# Patient Record
Sex: MALE | Race: BLACK OR AFRICAN AMERICAN
[De-identification: names, ages, dates, MRNs, and addresses within clinical notes are randomized per-mention and may not be internally consistent; named-entity substitution may affect disease eponyms.]

---

## 2019-04-28 ENCOUNTER — HOSPITAL ENCOUNTER (INPATIENT)
Dept: HOSPITAL 61 - ER | Age: 64
LOS: 2 days | Discharge: HOME | DRG: 871 | End: 2019-04-30
Attending: INTERNAL MEDICINE | Admitting: INTERNAL MEDICINE
Payer: COMMERCIAL

## 2019-04-28 VITALS — SYSTOLIC BLOOD PRESSURE: 135 MMHG | DIASTOLIC BLOOD PRESSURE: 79 MMHG

## 2019-04-28 VITALS — DIASTOLIC BLOOD PRESSURE: 109 MMHG | SYSTOLIC BLOOD PRESSURE: 182 MMHG

## 2019-04-28 VITALS — BODY MASS INDEX: 21.85 KG/M2 | HEIGHT: 76 IN | WEIGHT: 179.44 LBS

## 2019-04-28 VITALS — DIASTOLIC BLOOD PRESSURE: 68 MMHG | SYSTOLIC BLOOD PRESSURE: 138 MMHG

## 2019-04-28 DIAGNOSIS — I49.3: ICD-10-CM

## 2019-04-28 DIAGNOSIS — I71.4: ICD-10-CM

## 2019-04-28 DIAGNOSIS — I50.33: ICD-10-CM

## 2019-04-28 DIAGNOSIS — Z99.2: ICD-10-CM

## 2019-04-28 DIAGNOSIS — I16.1: ICD-10-CM

## 2019-04-28 DIAGNOSIS — I13.2: ICD-10-CM

## 2019-04-28 DIAGNOSIS — S33.5XXA: ICD-10-CM

## 2019-04-28 DIAGNOSIS — E78.5: ICD-10-CM

## 2019-04-28 DIAGNOSIS — Z86.73: ICD-10-CM

## 2019-04-28 DIAGNOSIS — I34.0: ICD-10-CM

## 2019-04-28 DIAGNOSIS — M19.90: ICD-10-CM

## 2019-04-28 DIAGNOSIS — I24.8: ICD-10-CM

## 2019-04-28 DIAGNOSIS — A41.9: Primary | ICD-10-CM

## 2019-04-28 DIAGNOSIS — F12.90: ICD-10-CM

## 2019-04-28 DIAGNOSIS — D63.1: ICD-10-CM

## 2019-04-28 DIAGNOSIS — M17.0: ICD-10-CM

## 2019-04-28 DIAGNOSIS — J44.0: ICD-10-CM

## 2019-04-28 DIAGNOSIS — Z87.891: ICD-10-CM

## 2019-04-28 DIAGNOSIS — J40: ICD-10-CM

## 2019-04-28 DIAGNOSIS — J18.9: ICD-10-CM

## 2019-04-28 DIAGNOSIS — N18.6: ICD-10-CM

## 2019-04-28 DIAGNOSIS — Z82.49: ICD-10-CM

## 2019-04-28 LAB
ALBUMIN SERPL-MCNC: 3.3 G/DL (ref 3.4–5)
ALBUMIN/GLOB SERPL: 0.6 {RATIO} (ref 1–1.7)
ALP SERPL-CCNC: 129 U/L (ref 46–116)
ALT SERPL-CCNC: 10 U/L (ref 16–63)
ANION GAP SERPL CALC-SCNC: 12 MMOL/L (ref 6–14)
APTT BLD: 31 SEC (ref 24–38)
AST SERPL-CCNC: 17 U/L (ref 15–37)
BASOPHILS # BLD AUTO: 0.1 X10^3/UL (ref 0–0.2)
BASOPHILS NFR BLD: 1 % (ref 0–3)
BILIRUB SERPL-MCNC: 0.7 MG/DL (ref 0.2–1)
BUN SERPL-MCNC: 47 MG/DL (ref 8–26)
BUN/CREAT SERPL: 4 (ref 6–20)
CALCIUM SERPL-MCNC: 10.3 MG/DL (ref 8.5–10.1)
CHLORIDE SERPL-SCNC: 96 MMOL/L (ref 98–107)
CO2 SERPL-SCNC: 33 MMOL/L (ref 21–32)
CREAT SERPL-MCNC: 12.7 MG/DL (ref 0.7–1.3)
EOSINOPHIL NFR BLD: 0.4 X10^3/UL (ref 0–0.7)
EOSINOPHIL NFR BLD: 4 % (ref 0–3)
ERYTHROCYTE [DISTWIDTH] IN BLOOD BY AUTOMATED COUNT: 18 % (ref 11.5–14.5)
GFR SERPLBLD BASED ON 1.73 SQ M-ARVRAT: 4.8 ML/MIN
GLOBULIN SER-MCNC: 5.2 G/DL (ref 2.2–3.8)
GLUCOSE SERPL-MCNC: 99 MG/DL (ref 70–99)
HCT VFR BLD CALC: 34 % (ref 39–53)
HGB BLD-MCNC: 11 G/DL (ref 13–17.5)
LYMPHOCYTES # BLD: 1.1 X10^3/UL (ref 1–4.8)
LYMPHOCYTES NFR BLD AUTO: 11 % (ref 24–48)
MCH RBC QN AUTO: 28 PG (ref 25–35)
MCHC RBC AUTO-ENTMCNC: 32 G/DL (ref 31–37)
MCV RBC AUTO: 86 FL (ref 79–100)
MONO #: 1.1 X10^3/UL (ref 0–1.1)
MONOCYTES NFR BLD: 11 % (ref 0–9)
NEUT #: 6.9 X10^3UL (ref 1.8–7.7)
NEUTROPHILS NFR BLD AUTO: 72 % (ref 31–73)
PLATELET # BLD AUTO: 174 X10^3/UL (ref 140–400)
POTASSIUM SERPL-SCNC: 3.9 MMOL/L (ref 3.5–5.1)
PROT SERPL-MCNC: 8.5 G/DL (ref 6.4–8.2)
PROTHROMBIN TIME: 15.3 SEC (ref 11.7–14)
RBC # BLD AUTO: 3.94 X10^6/UL (ref 4.3–5.7)
SODIUM SERPL-SCNC: 141 MMOL/L (ref 136–145)
WBC # BLD AUTO: 9.6 X10^3/UL (ref 4–11)

## 2019-04-28 PROCEDURE — 83880 ASSAY OF NATRIURETIC PEPTIDE: CPT

## 2019-04-28 PROCEDURE — 71045 X-RAY EXAM CHEST 1 VIEW: CPT

## 2019-04-28 PROCEDURE — 87040 BLOOD CULTURE FOR BACTERIA: CPT

## 2019-04-28 PROCEDURE — 87880 STREP A ASSAY W/OPTIC: CPT

## 2019-04-28 PROCEDURE — 85610 PROTHROMBIN TIME: CPT

## 2019-04-28 PROCEDURE — 93306 TTE W/DOPPLER COMPLETE: CPT

## 2019-04-28 PROCEDURE — 80053 COMPREHEN METABOLIC PANEL: CPT

## 2019-04-28 PROCEDURE — 85025 COMPLETE CBC W/AUTO DIFF WBC: CPT

## 2019-04-28 PROCEDURE — 84484 ASSAY OF TROPONIN QUANT: CPT

## 2019-04-28 PROCEDURE — 83605 ASSAY OF LACTIC ACID: CPT

## 2019-04-28 PROCEDURE — 70450 CT HEAD/BRAIN W/O DYE: CPT

## 2019-04-28 PROCEDURE — 80048 BASIC METABOLIC PNL TOTAL CA: CPT

## 2019-04-28 PROCEDURE — 85730 THROMBOPLASTIN TIME PARTIAL: CPT

## 2019-04-28 PROCEDURE — 87340 HEPATITIS B SURFACE AG IA: CPT

## 2019-04-28 PROCEDURE — 36415 COLL VENOUS BLD VENIPUNCTURE: CPT

## 2019-04-28 PROCEDURE — 80202 ASSAY OF VANCOMYCIN: CPT

## 2019-04-28 PROCEDURE — 94760 N-INVAS EAR/PLS OXIMETRY 1: CPT

## 2019-04-28 PROCEDURE — 93005 ELECTROCARDIOGRAM TRACING: CPT

## 2019-04-28 PROCEDURE — 72100 X-RAY EXAM L-S SPINE 2/3 VWS: CPT

## 2019-04-28 PROCEDURE — 96365 THER/PROPH/DIAG IV INF INIT: CPT

## 2019-04-28 PROCEDURE — 94640 AIRWAY INHALATION TREATMENT: CPT

## 2019-04-28 RX ADMIN — BUDESONIDE SCH MG: 0.5 INHALANT RESPIRATORY (INHALATION) at 20:09

## 2019-04-28 RX ADMIN — CEFTRIAXONE SCH GM: 1 INJECTION, POWDER, FOR SOLUTION INTRAMUSCULAR; INTRAVENOUS at 15:12

## 2019-04-28 RX ADMIN — BENZONATATE SCH MG: 100 CAPSULE, LIQUID FILLED ORAL at 20:59

## 2019-04-28 RX ADMIN — LABETALOL HYDROCHLORIDE PRN MG: 5 INJECTION, SOLUTION INTRAVENOUS at 15:13

## 2019-04-28 RX ADMIN — IPRATROPIUM BROMIDE AND ALBUTEROL SULFATE SCH ML: .5; 3 SOLUTION RESPIRATORY (INHALATION) at 16:02

## 2019-04-28 RX ADMIN — VANCOMYCIN HYDROCHLORIDE PRN EACH: 1 INJECTION, POWDER, LYOPHILIZED, FOR SOLUTION INTRAVENOUS at 16:52

## 2019-04-28 RX ADMIN — SEVELAMER CARBONATE SCH MG: 800 TABLET, FILM COATED ORAL at 17:27

## 2019-04-28 RX ADMIN — LABETALOL HYDROCHLORIDE PRN MG: 5 INJECTION, SOLUTION INTRAVENOUS at 17:28

## 2019-04-28 RX ADMIN — LIDOCAINE SCH PATCH: 50 PATCH CUTANEOUS at 15:20

## 2019-04-28 RX ADMIN — CETIRIZINE HYDROCHLORIDE SCH MG: 10 TABLET, FILM COATED ORAL at 15:20

## 2019-04-28 RX ADMIN — ATORVASTATIN CALCIUM SCH MG: 40 TABLET, FILM COATED ORAL at 20:59

## 2019-04-28 RX ADMIN — MORPHINE SULFATE PRN MG: 2 INJECTION, SOLUTION INTRAMUSCULAR; INTRAVENOUS at 17:28

## 2019-04-28 RX ADMIN — IPRATROPIUM BROMIDE AND ALBUTEROL SULFATE SCH ML: .5; 3 SOLUTION RESPIRATORY (INHALATION) at 20:09

## 2019-04-28 RX ADMIN — LABETALOL HYDROCHLORIDE PRN MG: 5 INJECTION, SOLUTION INTRAVENOUS at 15:12

## 2019-04-28 RX ADMIN — GUAIFENESIN AND DEXTROMETHORPHAN PRN ML: 100; 10 SYRUP ORAL at 21:00

## 2019-04-28 RX ADMIN — CALCIUM ACETATE SCH MG: 667 CAPSULE ORAL at 17:28

## 2019-04-28 NOTE — RAD
CT HEAD WO CONTRAST

 

History: Headache, hypertension

 

Comparison: None.

 

Technique: Noncontrast CT imaging was performed of the head.  Exposure: 

One or more of the following individualized dose reduction techniques were

utilized for this examination:  1. Automated exposure control  2. 

Adjustment of the mA and/or kV according to patient size  3. Use of 

iterative reconstruction technique.

 

Findings: No acute extra-axial or parenchymal hemorrhage is identified. 

There is no significant intra-axial mass effect, midline shift, or 

extra-axial fluid collection. The gray-white differentiation of the major 

vascular territories is preserved.  There is moderate supratentorial 

atrophy, ventricular size proportionate to the sulcal spaces. Some areas 

of more linear hyperdensity in the extra axial spaces of the frontal 

regions bilaterally are believed to be due to vessels. There are fairly 

large old lacunar infarcts of the left basal ganglia and corona radiata 

and also the right anterior basal ganglia. There are some other 

ill-defined low-density of the supratentorial parenchyma bilaterally. 

Mastoid air cells are aerated. Density in the external auditory canals 

bilaterally is nonspecific although probably due to cerumen. There is 

moderate to severe ethmoid air cell mucosal thickening/partial 

opacification greater anteriorly. There is small air-fluid level of the 

left frontal sinus. There is near complete opacification of visualized 

left maxillary sinus. There is mild sphenoid sinus mucosal thickening 

greater on the left. No acute calvarial abnormality is identified. There 

is atherosclerotic calcification carotid siphons and intradural vertebral 

arteries.

 

Impression:

1. There is no evidence of acute intracranial hemorrhage. There are old 

lacunar infarcts as stated, other ill-defined low-density probably due to 

chronic microvascular ischemic disease. There is generalized 

supratentorial atrophy.

2. There is paranasal sinus mucosal thickening as stated, near complete 

opacification of the visualized left maxillary sinus. There is air-fluid 

level in the left frontal sinus which could be seen with acute sinusitis.

 

Electronically signed by: Sonny Zayas MD (4/28/2019 1:20 PM) [facility]

## 2019-04-28 NOTE — RAD
Lumbar spine 2 views.

 

History history: Acute back pain

 

2 views were taken of the lumbar spine. There is atherosclerotic change in

the aorta with an aneurysm of the abdominal aorta. Lumbar spine is in 

normal alignment. Disc spaces are normal in height. There is a 

calcification along the right psoas probably a phlebolith although 

ureteral calculus would be possible.

 

IMPRESSION:

1. No fracture noted in the lumbar spine.

2. Abdominal aortic aneurysm.

3. Probable phlebolith versus ureteral calculus on the right.

 

Electronically signed by: Corky Sanchez MD (4/28/2019 3:46 PM) [id number]

## 2019-04-28 NOTE — NUR
Pharmacy Vancomycin Dosing Note



S: Consulted to monitor and dose vancomycin started 04/28/19.



O: KOKI TILLMAN is a 64 year old M with HCAP, .



Other Antibiotics: ROCEPHIN



LABS:

Last BUN: 47 

Last Creatinine: DIALYSIS 

Creatinine Clearance: DIALYSIS mL/min

Last WBC: 9.6 

Tmax (past 24 hours): AFEBRILE 



Vancomycin Dosing:

Dosing Weight: Adjusted

Target Trough: 15-20





A: Based on:  VANCO dosing guidelines





P: 1. Begin Vancomycin 1750mg LOAD dose, then 500 mg IV AFTER DIALYSIS MWF

   2. Follow up Random level on 04/29/19 at 0500 

   3. Pharmacy will continue to monitor, follow and adjust therapy as needed.

 

 GEE CAI, [facility], 04/28/19 3507

## 2019-04-28 NOTE — RAD
CHEST AP ONLY

 

History: Cough, shortness of air, high blood pressure

 

Comparison: None.

 

Findings:

Single view of the chest is submitted. 

There is some infiltrate of the right infrahilar region/right lung base, 

also mild infiltrate of the left lateral lung base. There is emphysema. 

There is atherosclerotic calcification of the thoracic aorta, somewhat 

tortuous thoracic aorta. Pericardial cardiac silhouette is somewhat 

enlarged. There is no pneumothorax. There is no significant pleural fluid.

 

Impression: 

 

1.  There are some infiltrates of the lung bases bilaterally, no previous 

exam to evaluate for change. There is emphysema.

 

Electronically signed by: Sonny Zayas MD (4/28/2019 2:08 PM) [facility]

## 2019-04-28 NOTE — NUR
Patient admitted from Emergency department to room 671. Received report from KYLER Gaspar. Pt 
transported via bed. All belongings left with patient at the time of transfer. Verified IV 
lines and medication orders. Pt stated "my blood pressure runs high. 170's-180's is normal 
for me." BP noted to be 182/109, HR 87. Pt stated he has not taken any of his blood pressure 
medications today. PRN orders active. Will continue to monitor.

## 2019-04-28 NOTE — PDOC1
History and Physical


Date of Admission


Date of Admission


DATE: 4/28/19 


TIME: 14:51





Identification/Chief Complaint


Chief Complaint


eye hurt





Source


Source:  Caregiver, Chart review, Patient





History of Present Illness


History of Present Illness


64-year-old -American male who came in because his eye hurts especially 

every time when he coughs. He also has been having watery eyes,  runny nose past

few days and acute onset 2-3 days back pain with no precipitating trauma or 

injury.


EMS BP is 230. Still 190s her systolic hence admitted. He is ESRD Monday Wednesday Friday and did not miss dialysis, last session was Friday and c

reatinine is 12.67 with normal bicarbonate and K. He has URI symptoms, sounds 

NaSal, denies recent sick contacts or travels.


CT head is negative for any acute intracranial hemorrhage positive for sinusitis

and mucosal thickening


Chest x-ray also shows emphysematous lungs-he is a previous smoker, and maybe 

infiltrates or haziness on the lower basis


Got empiric antibiotics at the emergency room. Afebrile with WBC 11


Troponin 0.2 in the background of kidney disease. He does not know his home 

meds, he is for sure on blood pressure regimens like Norvasc and metoprolol. His

pharmacy is [facility]


BNP is 35,000 in the background of chronic kidney disease





Past Medical History


Cardiovascular:  HTN


Pulmonary:  Bronchitis, COPD


Renal/:  Chronic renal insuff





Past Surgical History


Past Surgical History:  Other (AV fistula)





Family History


Family History:  Hypertension





Social History


Smoke:  Quit


ALCOHOL:  none


Drugs:  None





Current Problem List


Problem List


Problems


Medical Problems:


(1) HCAP (healthcare-associated pneumonia)


Status: Acute  





(2) Hypertensive urgency


Status: Acute  





(3) Sinusitis


Status: Acute  











Current Medications


Current Medications





Current Medications


Clonidine HCl (Catapres) 0.2 mg 1X  ONCE PO  Last administered on 4/28/19at 

12:41;  Start 4/28/19 at 12:45;  Stop 4/28/19 at 12:46;  Status DC


Piperacillin Sod/ Tazobactam Sod 3.375 gm/Sodium Chloride 50 ml @  100 mls/hr 1X

 ONCE IV ;  Start 4/28/19 at 14:45;  Stop 4/28/19 at 15:14;  Status UNV


Vancomycin HCl (Vanco Per Pharmacy) 1 each PRN DAILY  PRN MC SEE COMMENTS;  

Start 4/28/19 at 14:45;  Status UNV


Morphine Sulfate (Morphine Sulfate) 4 mg 1X  ONCE IV ;  Start 4/28/19 at 14:45; 

Stop 4/28/19 at 14:46;  Status DC


Piperacillin Sod/ Tazobactam Sod 2.25 gm/Sodium Chloride 50 ml @  100 mls/hr 1X 

ONCE IV ;  Start 4/28/19 at 14:45;  Stop 4/28/19 at 15:14


Vancomycin HCl 1.75 gm/Sodium Chloride 500 ml @  250 mls/hr 1X  ONCE IV ;  Start

4/28/19 at 14:45;  Stop 4/28/19 at 16:44


Ceftriaxone Sodium (Rocephin) 1 gm Q24H IVP ;  Start 4/28/19 at 14:45;  Status 

UNV


Benzonatate (Tessalon Perle) 100 mg FMM562 PO ;  Start 4/28/19 at 21:00;  Status

UNV


Albuterol/ Ipratropium (Duoneb) 3 ml RTQID NEB ;  Start 4/28/19 at 16:00;  

Status UNV


Guaifenesin (Robitussin Dm) 10 ml PRN Q6HRS  PRN PO COUGH;  Start 4/28/19 at 

14:45;  Status UNV


Clonidine HCl (Catapres) 0.1 mg PRN Q1HR  PRN PO HYPERTENSION, SEE COMMENTS;  

Start 4/28/19 at 14:45;  Status UNV


Temazepam (Restoril) 7.5 mg PRN QHS  PRN PO INSOMNIA;  Start 4/28/19 at 14:45;  

Status UNV


Ondansetron HCl (Zofran) 4 mg PRN Q6HRS  PRN IV NAUSEA/VOMITING;  Start 4/28/19 

at 14:45;  Status UNV


Acetaminophen (Tylenol) 500 mg PRN Q6HRS  PRN PO MILD PAIN / TEMP;  Start 

4/28/19 at 14:45;  Status UNV


Acetaminophen/ Codeine Phosphate (Tylenol #3) 1 tab PRN Q6HRS  PRN PO PAIN;  

Start 4/28/19 at 14:45;  Status UNV


Morphine Sulfate (Morphine Sulfate) 2 mg PRN Q2HR  PRN IV PAIN;  Start 4/28/19 

at 14:45;  Status UNV


Labetalol HCl (Normodyne Iv Push) 20 mg PRN Q2HR  PRN IVP HYPERTENSION, SEE 

COMMENTS;  Start 4/28/19 at 14:45;  Status UNV





Allergies


Allergies:  


Coded Allergies:  


     No Known Drug Allergies (Unverified , 1/7/16)





ROS


Review of System


Pos for runny nose, cough, ey hurts when he coughs and back pain left lumbar 

spine area/left flank area





Physical Exam


General:  Alert, Oriented X3, Cooperative, No acute distress, Other (has tissue 

packing on his left nostril from runny nose, nasal voice)


HEENT:  Atraumatic, PERRLA, EOMI


Lungs:  Normal air movement, Other (symmetrical chest expansion no retractions, 

diminished at the bases, no wheezing)


Heart:  S1S2, RRR, no thrills, no rubs, other (I hear a grade 4 systolic murmur 

left sternal border, sinus tachycardia 90s)


Cardiovascular:  S1, S2


Abdomen:  Normal bowel sounds, Soft, No tenderness, No hepatosplenomegaly, No 

masses


Male Genitals Exam:  normal genitalia, normal prostate


Rectal Exam:  not examined


Extremities:  No clubbing, No cyanosis, No edema, Normal pulses, No 

tenderness/swelling


Skin:  No rashes, No breakdown, No significant lesion


Neuro:  Normal gait, Normal speech, Strength at 5/5 X4 ext, Normal tone, 

Sensation intact, Cranial nerves 3-12 NL, Reflexes 2+


Psych/Mental Status:  Mental status NL, Mood NL





Vitals


Vitals





Vital Signs








  Date Time  Temp Pulse Resp B/P (MAP) Pulse Ox O2 Delivery O2 Flow Rate FiO2


 


4/28/19 12:41  104  241/138    


 


4/28/19 12:23 98.4  20  93 Room Air  





 98.4       











Labs


Labs





Laboratory Tests








Test


 4/28/19


12:30 4/28/19


12:54 4/28/19


13:18 4/28/19


14:15


 


White Blood Count


 9.6 x10^3/uL


(4.0-11.0) 


 


 





 


Red Blood Count


 3.94 x10^6/uL


(4.30-5.70) 


 


 





 


Hemoglobin


 11.0 g/dL


(13.0-17.5) 


 


 





 


Hematocrit


 34.0 %


(39.0-53.0) 


 


 





 


Mean Corpuscular Volume 86 fL ()    


 


Mean Corpuscular Hemoglobin 28 pg (25-35)    


 


Mean Corpuscular Hemoglobin


Concent 32 g/dL


(31-37) 


 


 





 


Red Cell Distribution Width


 18.0 %


(11.5-14.5) 


 


 





 


Platelet Count


 174 x10^3/uL


(140-400) 


 


 





 


Neutrophils (%) (Auto) 72 % (31-73)    


 


Lymphocytes (%) (Auto) 11 % (24-48)    


 


Monocytes (%) (Auto) 11 % (0-9)    


 


Eosinophils (%) (Auto) 4 % (0-3)    


 


Basophils (%) (Auto) 1 % (0-3)    


 


Neutrophils # (Auto)


 6.9 x10^3uL


(1.8-7.7) 


 


 





 


Lymphocytes # (Auto)


 1.1 x10^3/uL


(1.0-4.8) 


 


 





 


Monocytes # (Auto)


 1.1 x10^3/uL


(0.0-1.1) 


 


 





 


Eosinophils # (Auto)


 0.4 x10^3/uL


(0.0-0.7) 


 


 





 


Basophils # (Auto)


 0.1 x10^3/uL


(0.0-0.2) 


 


 





 


Prothrombin Time


 15.3 SEC


(11.7-14.0) 


 


 





 


Prothromb Time International


Ratio 1.2 (0.8-1.1) 


 


 


 





 


Activated Partial


Thromboplast Time 31 SEC (24-38) 


 


 


 





 


Troponin I Quantitative


 0.214 ng/mL


(0.000-0.055) 


 


 





 


NT-Pro-B-Type Natriuretic


Peptide > 01385 pg/mL


(0-124) 


 


 





 


Group A Streptococcus Rapid


 


 Positive


(NEGATIVE) 


 





 


Sodium Level


 


 


 141 mmol/L


(136-145) 





 


Potassium Level


 


 


 3.9 mmol/L


(3.5-5.1) 





 


Chloride Level


 


 


 96 mmol/L


() 





 


Carbon Dioxide Level


 


 


 33 mmol/L


(21-32) 





 


Anion Gap   12 (6-14)  


 


Blood Urea Nitrogen


 


 


 47 mg/dL


(8-26) 





 


Creatinine


 


 


 12.7 mg/dL


(0.7-1.3) 





 


Estimated GFR


(Cockcroft-Gault) 


 


 4.8 


 





 


BUN/Creatinine Ratio   4 (6-20)  


 


Glucose Level


 


 


 99 mg/dL


(70-99) 





 


Calcium Level


 


 


 10.3 mg/dL


(8.5-10.1) 





 


Total Bilirubin


 


 


 0.7 mg/dL


(0.2-1.0) 





 


Aspartate Amino Transf


(AST/SGOT) 


 


 17 U/L (15-37) 


 





 


Alanine Aminotransferase


(ALT/SGPT) 


 


 10 U/L (16-63) 


 





 


Alkaline Phosphatase


 


 


 129 U/L


() 





 


Total Protein


 


 


 8.5 g/dL


(6.4-8.2) 





 


Albumin


 


 


 3.3 g/dL


(3.4-5.0) 





 


Albumin/Globulin Ratio   0.6 (1.0-1.7)  


 


Lactic Acid Level


 


 


 


 1.3 mmol/L


(0.4-2.0)








Laboratory Tests








Test


 4/28/19


12:30 4/28/19


12:54 4/28/19


13:18 4/28/19


14:15


 


White Blood Count


 9.6 x10^3/uL


(4.0-11.0) 


 


 





 


Red Blood Count


 3.94 x10^6/uL


(4.30-5.70) 


 


 





 


Hemoglobin


 11.0 g/dL


(13.0-17.5) 


 


 





 


Hematocrit


 34.0 %


(39.0-53.0) 


 


 





 


Mean Corpuscular Volume 86 fL ()    


 


Mean Corpuscular Hemoglobin 28 pg (25-35)    


 


Mean Corpuscular Hemoglobin


Concent 32 g/dL


(31-37) 


 


 





 


Red Cell Distribution Width


 18.0 %


(11.5-14.5) 


 


 





 


Platelet Count


 174 x10^3/uL


(140-400) 


 


 





 


Neutrophils (%) (Auto) 72 % (31-73)    


 


Lymphocytes (%) (Auto) 11 % (24-48)    


 


Monocytes (%) (Auto) 11 % (0-9)    


 


Eosinophils (%) (Auto) 4 % (0-3)    


 


Basophils (%) (Auto) 1 % (0-3)    


 


Neutrophils # (Auto)


 6.9 x10^3uL


(1.8-7.7) 


 


 





 


Lymphocytes # (Auto)


 1.1 x10^3/uL


(1.0-4.8) 


 


 





 


Monocytes # (Auto)


 1.1 x10^3/uL


(0.0-1.1) 


 


 





 


Eosinophils # (Auto)


 0.4 x10^3/uL


(0.0-0.7) 


 


 





 


Basophils # (Auto)


 0.1 x10^3/uL


(0.0-0.2) 


 


 





 


Prothrombin Time


 15.3 SEC


(11.7-14.0) 


 


 





 


Prothromb Time International


Ratio 1.2 (0.8-1.1) 


 


 


 





 


Activated Partial


Thromboplast Time 31 SEC (24-38) 


 


 


 





 


Troponin I Quantitative


 0.214 ng/mL


(0.000-0.055) 


 


 





 


NT-Pro-B-Type Natriuretic


Peptide > 39155 pg/mL


(0-124) 


 


 





 


Group A Streptococcus Rapid


 


 Positive


(NEGATIVE) 


 





 


Sodium Level


 


 


 141 mmol/L


(136-145) 





 


Potassium Level


 


 


 3.9 mmol/L


(3.5-5.1) 





 


Chloride Level


 


 


 96 mmol/L


() 





 


Carbon Dioxide Level


 


 


 33 mmol/L


(21-32) 





 


Anion Gap   12 (6-14)  


 


Blood Urea Nitrogen


 


 


 47 mg/dL


(8-26) 





 


Creatinine


 


 


 12.7 mg/dL


(0.7-1.3) 





 


Estimated GFR


(Cockcroft-Gault) 


 


 4.8 


 





 


BUN/Creatinine Ratio   4 (6-20)  


 


Glucose Level


 


 


 99 mg/dL


(70-99) 





 


Calcium Level


 


 


 10.3 mg/dL


(8.5-10.1) 





 


Total Bilirubin


 


 


 0.7 mg/dL


(0.2-1.0) 





 


Aspartate Amino Transf


(AST/SGOT) 


 


 17 U/L (15-37) 


 





 


Alanine Aminotransferase


(ALT/SGPT) 


 


 10 U/L (16-63) 


 





 


Alkaline Phosphatase


 


 


 129 U/L


() 





 


Total Protein


 


 


 8.5 g/dL


(6.4-8.2) 





 


Albumin


 


 


 3.3 g/dL


(3.4-5.0) 





 


Albumin/Globulin Ratio   0.6 (1.0-1.7)  


 


Lactic Acid Level


 


 


 


 1.3 mmol/L


(0.4-2.0)











VTE Prophylaxis Ordered


VTE Prophylaxis Devices:  Yes


VTE Pharmacological Prophylaxi:  Yes





Assessment/Plan


Assessment/Plan


Hypertensive emergency with blood pressure systolic over 230s of admission


Anemia of ESRD


ESRD does is Monday Wednesday Friday-did not miss any dialysis sessions


Sinusitis


URI with pneumonia-empiric Rocephin


Grade 4 systolic murmur left sternal border with some frequent PVCs-check echo- 

awaiting home emds,if [facility] perist might need inc dose BB or cardizem if on any


Acute Onset back pain, left lumbar area


Leukocytosis/Sepsis-tachycardic, tachypneic, leukocytosis on admission





PLAN:


Admit 2 midnights


Labetalol when necessary, drip if not resolved


CVC admit


Clonidine when necessary if he goes up to the [facility] telemetry floor


Check an echo for that murmur and frequent PVCs


Awaiting home meds


Might need increase in beta blocker or Cardizem dose if he is on any


PT OT


Consult dialysis/renal for dialysis


Monitor that leukocytosis


Start empiric Rocephin DuoNeb's and other URI symptoms medication


Lumbar spine 2-3 views now for the acute onset back pain


Trial of Lidoderm patch for the back pain


Physiatry consult for that acute onset back pain


H1 antagonists for the sinusitis


Seen at ER


Full code











AURY BANKS MD           Apr 28, 2019 14:59

## 2019-04-28 NOTE — PHYS DOC
Past Medical History


Past Medical History:  CVA, Hypertension, Hepatitis, Liver Disease, Renal 

Failure, Stroke


Additional Past Medical Histor:  HEP C,


Past Surgical History:  Other


Additional Past Surgical Histo:  LEFT FA SHUNT


Alcohol Use:  None


Drug Use:  Marijuana





Adult General


Chief Complaint


Chief Complaint:  EYE PROBLEMS





HPI


HPI





Patient is a 64  year old -American male who was brought here by EMS for 

evaluation of left eye pain, sore throat. Patient said he only had the pain in 

his left eye whenever he coughs. Patient denies any headache, no blurry vision. 

Patient has history of hypertension, he is on amlodipine and metoprolol, he had 

not taken his medication today yet. Patient has history of end-stage renal 

failure on hemodialysis. Patient had received dialysis on Friday. Patient said 

he is on fluid restriction so he had not been eating or drinking much. Patient 

denies any chest pain or any trouble breathing.





Review of Systems


Review of Systems





Constitutional: Denies fever or chills []


Eyes: Denies change in visual acuity, redness, positive for left  eye pain only 

when he coughs


HENT: Positive for  nasal congestion and  sore throat []


Respiratory: POSITIVE FOR cough, no shortness of breath []


Cardiovascular: No additional information not addressed in HPI []


GI: Denies abdominal pain, nausea, vomiting, bloody stools or diarrhea []


: Denies dysuria or hematuria []


Musculoskeletal: Denies back pain or joint pain []


Integument: Denies rash or skin lesions []


Neurologic: Denies headache, focal weakness or sensory changes []


Endocrine: Denies polyuria or polydipsia []





All other systems were reviewed and found to be within normal limits, except as 

documented in this note.





Current Medications


Current Medications





Current Medications








 Medications


  (Trade)  Dose


 Ordered  Sig/Juan  Start Time


 Stop Time Status Last Admin


Dose Admin


 


 Clonidine HCl


  (Catapres)  0.2 mg  1X  ONCE  19 12:45


 19 12:46 DC 19 12:41


0.2 MG











Allergies


Allergies





Allergies








Coded Allergies Type Severity Reaction Last Updated Verified


 


  No Known Drug Allergies    16 No











Physical Exam


Physical Exam





Constitutional: Well developed, well nourished, no acute distress, non-toxic 

appearance. []


HENT: Normocephalic, atraumatic, bilateral external ears normal, ORAL MUCOSA IS 

VERY DRIED, OROPHARYNGEAL IS ERYTHEMATOUS, NASAL NARE DRIED. 


Eyes: PERRLA, EOMI, conjunctiva normal, no discharge. [] 


Neck: Normal range of motion, no tenderness, supple, no stridor. [] 


Cardiovascular:Heart rate regular rhythm, no murmur []


Lungs & Thorax:  Bilateral breath sounds clear to auscultation []


Abdomen: Bowel sounds normal, soft, no tenderness, no masses, no pulsatile 

masses. [] 


Skin: Warm, dry, no erythema, no rash. [] 


Back: No tenderness, no CVA tenderness. [] 


Extremities: No tenderness, no cyanosis, no clubbing, ROM intact, no edema. [] 


Neurologic: Alert and oriented X 3, normal motor function, normal sensory 

function, no focal deficits noted. []


Psychologic: Affect normal, judgement normal, mood normal. []





Current Patient Data


Vital Signs





                                   Vital Signs








  Date Time  Temp Pulse Resp B/P (MAP) Pulse Ox O2 Delivery O2 Flow Rate FiO2


 


19 13:50  94 18 203/111 (141) 95   


 


19 12:47      Room Air  


 


19 12:23 98.4       





 98.4       








Lab Values





                                Laboratory Tests








Test


 19


12:30 19


12:54 19


13:18 19


14:15


 


White Blood Count


 9.6 x10^3/uL


(4.0-11.0) 


 


 





 


Red Blood Count


 3.94 x10^6/uL


(4.30-5.70)  L 


 


 





 


Hemoglobin


 11.0 g/dL


(13.0-17.5)  L 


 


 





 


Hematocrit


 34.0 %


(39.0-53.0)  L 


 


 





 


Mean Corpuscular Volume


 86 fL ()


 


 


 





 


Mean Corpuscular Hemoglobin 28 pg (25-35)     


 


Mean Corpuscular Hemoglobin


Concent 32 g/dL


(31-37) 


 


 





 


Red Cell Distribution Width


 18.0 %


(11.5-14.5)  H 


 


 





 


Platelet Count


 174 x10^3/uL


(140-400) 


 


 





 


Neutrophils (%) (Auto) 72 % (31-73)     


 


Lymphocytes (%) (Auto) 11 % (24-48)  L   


 


Monocytes (%) (Auto) 11 % (0-9)  H   


 


Eosinophils (%) (Auto) 4 % (0-3)  H   


 


Basophils (%) (Auto) 1 % (0-3)     


 


Neutrophils # (Auto)


 6.9 x10^3uL


(1.8-7.7) 


 


 





 


Lymphocytes # (Auto)


 1.1 x10^3/uL


(1.0-4.8) 


 


 





 


Monocytes # (Auto)


 1.1 x10^3/uL


(0.0-1.1) 


 


 





 


Eosinophils # (Auto)


 0.4 x10^3/uL


(0.0-0.7) 


 


 





 


Basophils # (Auto)


 0.1 x10^3/uL


(0.0-0.2) 


 


 





 


Prothrombin Time


 15.3 SEC


(11.7-14.0)  H 


 


 





 


Prothrombin Time INR


 1.2 (0.8-1.1)


H 


 


 





 


PTT


 31 SEC (24-38)


 


 


 





 


Troponin I Quantitative


 0.214 ng/mL


(0.000-0.055) 


 


 





 


NT-Pro-B-Type Natriuretic


Peptide > 79981 pg/mL


(0-124)  H 


 


 





 


Group A Streptococcus Rapid


 


 Positive


(NEGATIVE) 


 





 


Sodium Level


 


 


 141 mmol/L


(136-145) 





 


Potassium Level


 


 


 3.9 mmol/L


(3.5-5.1) 





 


Chloride Level


 


 


 96 mmol/L


()  L 





 


Carbon Dioxide Level


 


 


 33 mmol/L


(21-32)  H 





 


Anion Gap   12 (6-14)   


 


Blood Urea Nitrogen


 


 


 47 mg/dL


(8-26)  H 





 


Creatinine


 


 


 12.7 mg/dL


(0.7-1.3)  H 





 


Estimated GFR


(Cockcroft-Gault) 


 


 4.8  


 





 


BUN/Creatinine Ratio   4 (6-20)  L 


 


Glucose Level


 


 


 99 mg/dL


(70-99) 





 


Calcium Level


 


 


 10.3 mg/dL


(8.5-10.1)  H 





 


Total Bilirubin


 


 


 0.7 mg/dL


(0.2-1.0) 





 


Aspartate Amino Transferase


(AST) 


 


 17 U/L (15-37)


 





 


Alanine Aminotransferase (ALT)


 


 


 10 U/L (16-63)


L 





 


Alkaline Phosphatase


 


 


 129 U/L


()  H 





 


Total Protein


 


 


 8.5 g/dL


(6.4-8.2)  H 





 


Albumin


 


 


 3.3 g/dL


(3.4-5.0)  L 





 


Albumin/Globulin Ratio


 


 


 0.6 (1.0-1.7)


L 





 


Lactic Acid Level


 


 


 


 1.3 mmol/L


(0.4-2.0)





                                Laboratory Tests


19 12:30








                                Laboratory Tests


19 13:18














EKG


EKG


EKG was read by this physician at 1447, heart rate of 105 beats per minutes on a

 sinus tachycardia, multiple PVC. no STEMI





Radiology/Procedures


Radiology/Procedures


[][address] [address]


                                 [phone]


                                        


                                 IMAGING REPORT





                                     Signed





PATIENT: KOKI TILLMAN       ACCOUNT: [id number]     MRN#: [id number]


: 1955           LOCATION: ER              AGE: 64


SEX: M                    EXAM DT: 19         ACCESSION#: [id number]


STATUS: REG ER            ORD. PHYSICIAN: DIONNA SWENSON DO   


REASON: cough


PROCEDURE: CHEST AP ONLY





CHEST AP ONLY


 


History: Cough, shortness of air, high blood pressure


 


Comparison: None.


 


Findings:


Single view of the chest is submitted. 


There is some infiltrate of the right infrahilar region/right lung base, 


also mild infiltrate of the left lateral lung base. There is emphysema. 


There is atherosclerotic calcification of the thoracic aorta, somewhat 


tortuous thoracic aorta. Pericardial cardiac silhouette is somewhat 


enlarged. There is no pneumothorax. There is no significant pleural fluid.


 


Impression: 


 


1.  There are some infiltrates of the lung bases bilaterally, no previous 


exam to evaluate for change. There is emphysema.


 


Electronically signed by: Efrain Calhoun MD (2019 2:08 PM) [facility]














DICTATED and SIGNED BY:     EFRAIN CALHOUN MD


DATE:     19 1408


                            [address] [id number] [phone]


                                        


                                 IMAGING REPORT





                                     Signed





PATIENT: KOKI TILLMAN       ACCOUNT: [id number]     MRN#: [id number]


: 1955           LOCATION: ER              AGE: 64


SEX: M                    EXAM DT: 19         ACCESSION#: [id number]


STATUS: REG ER            ORD. PHYSICIAN: DIONNA SWENSON DO   


REASON: headache hypertension


PROCEDURE: CT HEAD WO CONTRAST





CT HEAD WO CONTRAST


 


History: Headache, hypertension


 


Comparison: None.


 


Technique: Noncontrast CT imaging was performed of the head.  Exposure: 


One or more of the following individualized dose reduction techniques were


utilized for this examination:  1. Automated exposure control  2. 


Adjustment of the mA and/or kV according to patient size  3. Use of 


iterative reconstruction technique.


 


Findings: No acute extra-axial or parenchymal hemorrhage is identified. 


There is no significant intra-axial mass effect, midline shift, or 


extra-axial fluid collection. The gray-white differentiation of the major 


vascular territories is preserved.  There is moderate supratentorial 


atrophy, ventricular size proportionate to the sulcal spaces. Some areas 


of more linear hyperdensity in the extra axial spaces of the frontal 


regions bilaterally are believed to be due to vessels. There are fairly 


large old lacunar infarcts of the left basal ganglia and corona radiata 


and also the right anterior basal ganglia. There are some other 


ill-defined low-density of the supratentorial parenchyma bilaterally. 


Mastoid air cells are aerated. Density in the external auditory canals 


bilaterally is nonspecific although probably due to cerumen. There is 


moderate to severe ethmoid air cell mucosal thickening/partial 


opacification greater anteriorly. There is small air-fluid level of the 


left frontal sinus. There is near complete opacification of visualized 


left maxillary sinus. There is mild sphenoid sinus mucosal thickening 


greater on the left. No acute calvarial abnormality is identified. There 


is atherosclerotic calcification carotid siphons and intradural vertebral 


arteries.


 


Impression:


1. There is no evidence of acute intracranial hemorrhage. There are old 


lacunar infarcts as stated, other ill-defined low-density probably due to 


chronic microvascular ischemic disease. There is generalized 


supratentorial atrophy.


2. There is paranasal sinus mucosal thickening as stated, near complete 


opacification of the visualized left maxillary sinus. There is air-fluid 


level in the left frontal sinus which could be seen with acute sinusitis.


 


Electronically signed by: Efrain Calhoun MD (2019 1:20 PM) [facility]














DICTATED and SIGNED BY:     EFRAIN CALHOUN MD


DATE:     19 6599





Course & Med Decision Making


Course & Med Decision Making


Pertinent Labs and Imaging studies reviewed. (See chart for details)





[]





Dragon Disclaimer


Dragon Disclaimer


This electronic medical record was generated, in whole or in part, using a voice

 recognition dictation system.





Departure


Departure


Impression:  


   Primary Impression:  


   HCAP (healthcare-associated pneumonia)


   Additional Impressions:  


   Sinusitis


   Hypertensive urgency


Disposition:   ADMITTED AS INPATIENT


Admitting Physician:  Zaria Bianchi


Condition:  STABLE


Referrals:  


NON,STAFF (PCP)





Problem Qualifiers











DIONNA SWENSON DO                2019 13:05

## 2019-04-29 VITALS — DIASTOLIC BLOOD PRESSURE: 97 MMHG | SYSTOLIC BLOOD PRESSURE: 172 MMHG

## 2019-04-29 VITALS — SYSTOLIC BLOOD PRESSURE: 173 MMHG | DIASTOLIC BLOOD PRESSURE: 104 MMHG

## 2019-04-29 VITALS — DIASTOLIC BLOOD PRESSURE: 106 MMHG | SYSTOLIC BLOOD PRESSURE: 179 MMHG

## 2019-04-29 VITALS — DIASTOLIC BLOOD PRESSURE: 99 MMHG | SYSTOLIC BLOOD PRESSURE: 169 MMHG

## 2019-04-29 VITALS — SYSTOLIC BLOOD PRESSURE: 143 MMHG | DIASTOLIC BLOOD PRESSURE: 104 MMHG

## 2019-04-29 LAB
ANION GAP SERPL CALC-SCNC: 11 MMOL/L (ref 6–14)
BUN SERPL-MCNC: 53 MG/DL (ref 8–26)
CALCIUM SERPL-MCNC: 9.7 MG/DL (ref 8.5–10.1)
CHLORIDE SERPL-SCNC: 99 MMOL/L (ref 98–107)
CO2 SERPL-SCNC: 32 MMOL/L (ref 21–32)
CREAT SERPL-MCNC: 13.9 MG/DL (ref 0.7–1.3)
GFR SERPLBLD BASED ON 1.73 SQ M-ARVRAT: 4.3 ML/MIN
GLUCOSE SERPL-MCNC: 106 MG/DL (ref 70–99)
POTASSIUM SERPL-SCNC: 4.1 MMOL/L (ref 3.5–5.1)
SODIUM SERPL-SCNC: 142 MMOL/L (ref 136–145)

## 2019-04-29 PROCEDURE — 5A1D70Z PERFORMANCE OF URINARY FILTRATION, INTERMITTENT, LESS THAN 6 HOURS PER DAY: ICD-10-PCS | Performed by: INTERNAL MEDICINE

## 2019-04-29 RX ADMIN — BUDESONIDE SCH MG: 0.5 INHALANT RESPIRATORY (INHALATION) at 20:07

## 2019-04-29 RX ADMIN — IPRATROPIUM BROMIDE AND ALBUTEROL SULFATE SCH ML: .5; 3 SOLUTION RESPIRATORY (INHALATION) at 12:02

## 2019-04-29 RX ADMIN — MORPHINE SULFATE PRN MG: 2 INJECTION, SOLUTION INTRAMUSCULAR; INTRAVENOUS at 05:19

## 2019-04-29 RX ADMIN — SEVELAMER CARBONATE SCH MG: 800 TABLET, FILM COATED ORAL at 09:56

## 2019-04-29 RX ADMIN — BENZONATATE SCH MG: 100 CAPSULE, LIQUID FILLED ORAL at 10:00

## 2019-04-29 RX ADMIN — CALCIUM ACETATE SCH MG: 667 CAPSULE ORAL at 17:00

## 2019-04-29 RX ADMIN — BENZONATATE SCH MG: 100 CAPSULE, LIQUID FILLED ORAL at 21:20

## 2019-04-29 RX ADMIN — SEVELAMER CARBONATE SCH MG: 800 TABLET, FILM COATED ORAL at 12:00

## 2019-04-29 RX ADMIN — BUDESONIDE SCH MG: 0.5 INHALANT RESPIRATORY (INHALATION) at 07:18

## 2019-04-29 RX ADMIN — VANCOMYCIN HYDROCHLORIDE PRN EACH: 1 INJECTION, POWDER, LYOPHILIZED, FOR SOLUTION INTRAVENOUS at 08:17

## 2019-04-29 RX ADMIN — ATORVASTATIN CALCIUM SCH MG: 40 TABLET, FILM COATED ORAL at 21:20

## 2019-04-29 RX ADMIN — CETIRIZINE HYDROCHLORIDE SCH MG: 10 TABLET, FILM COATED ORAL at 09:58

## 2019-04-29 RX ADMIN — LIDOCAINE SCH PATCH: 50 PATCH CUTANEOUS at 09:00

## 2019-04-29 RX ADMIN — MORPHINE SULFATE PRN MG: 2 INJECTION, SOLUTION INTRAMUSCULAR; INTRAVENOUS at 17:08

## 2019-04-29 RX ADMIN — GUAIFENESIN AND DEXTROMETHORPHAN PRN ML: 100; 10 SYRUP ORAL at 21:20

## 2019-04-29 RX ADMIN — SEVELAMER CARBONATE SCH MG: 800 TABLET, FILM COATED ORAL at 17:00

## 2019-04-29 RX ADMIN — IPRATROPIUM BROMIDE AND ALBUTEROL SULFATE SCH ML: .5; 3 SOLUTION RESPIRATORY (INHALATION) at 16:07

## 2019-04-29 RX ADMIN — ACETAMINOPHEN AND CODEINE PHOSPHATE PRN TAB: 300; 30 TABLET ORAL at 09:57

## 2019-04-29 RX ADMIN — CALCIUM ACETATE SCH MG: 667 CAPSULE ORAL at 12:00

## 2019-04-29 RX ADMIN — BENZONATATE SCH MG: 100 CAPSULE, LIQUID FILLED ORAL at 13:53

## 2019-04-29 RX ADMIN — LABETALOL HYDROCHLORIDE PRN MG: 5 INJECTION, SOLUTION INTRAVENOUS at 05:57

## 2019-04-29 RX ADMIN — IPRATROPIUM BROMIDE AND ALBUTEROL SULFATE SCH ML: .5; 3 SOLUTION RESPIRATORY (INHALATION) at 20:07

## 2019-04-29 RX ADMIN — LABETALOL HYDROCHLORIDE PRN MG: 5 INJECTION, SOLUTION INTRAVENOUS at 17:06

## 2019-04-29 RX ADMIN — Medication SCH CAP: at 21:20

## 2019-04-29 RX ADMIN — GUAIFENESIN AND DEXTROMETHORPHAN PRN ML: 100; 10 SYRUP ORAL at 05:57

## 2019-04-29 RX ADMIN — CEFTRIAXONE SCH GM: 1 INJECTION, POWDER, FOR SOLUTION INTRAMUSCULAR; INTRAVENOUS at 17:07

## 2019-04-29 RX ADMIN — IPRATROPIUM BROMIDE AND ALBUTEROL SULFATE SCH ML: .5; 3 SOLUTION RESPIRATORY (INHALATION) at 07:18

## 2019-04-29 RX ADMIN — CALCIUM ACETATE SCH MG: 667 CAPSULE ORAL at 09:56

## 2019-04-29 NOTE — PDOC2
CARDIAC CONSULT


DATE OF CONSULT


Date of Consult


DATE: 4/29/19 


TIME: 14:41





REASON FOR CONSULT


Reason for Consult:


Mitral regurgitation





REFERRING PHYSICIAN


Referring Physician:


Dr. Epperson





SOURCE


Source:  Chart review, Patient





HISTORY OF PRESENT ILLNESS


HISTORY OF PRESENT ILLNESS


This is a 63 yo male who presented due to eye pain with cough and sore throat. 

Echo notable for moderate to severe mitral regurgitation, which prompted this 

consult. Patient reports having cold with cough, congestion, rhinitis, and sore 

throat for the last 4 days. Began to have eye pain with coughing. Developed 

shortness of breath, but this resolved with HD. Routine exam noted systolic 

murmur, which prompted echocardiogram. Denies any chest pain, palpitation, 

dizziness, diaphoresis, or nausea/vomiting.


Patient follows outpatient cardiology at . Heart cath within the last couple 

of years. Echo within last year. Cannot recall name of cardiologist, but last 

appointment was a month ago.





PAST MEDICAL HISTORY


Cardiovascular:  CHF, HTN, Hyperlipidemia, Valve insufficiency


Pulmonary:  COPD


CENTRAL NERVOUS SYSTEM:  CVA


GI:  No pertinent hx


Heme/Onc:  No pertinent hx


Hepatobiliary:  Hep A/B/C


Psych:  No pertinent hx


Musculoskeletal:  Osteoarthritis


Rheumatologic:  No pertinent hx


Infectious disease:  No pertinent hx


ENT:  No pertinent hx


Renal/:  Chronic renal failure (ESRD on HD)


Endocrine:  No pertinent hx


Dermatology:  No pertinent hx





PAST SURGICAL HISTORY


Past Surgical History:  Hernia Repair, Other (Left FA shunt )





FAMILY HISTORY


Family History:  Other (no pertinent positives)





SOCIAL HISTORY


Smoke:  Quit (2 years ago)


ALCOHOL:  occassional


Drugs:  Marijuana


Lives:  Alone





CURRENT MEDICATIONS


CURRENT MEDICATIONS





Current Medications








 Medications


  (Trade)  Dose


 Ordered  Sig/Juan


 Route


 PRN Reason  Start Time


 Stop Time Status Last Admin


Dose Admin


 


 Vancomycin HCl


  (Vanco Per


 Pharmacy)  1 each  PRN DAILY  PRN


 MC


 SEE COMMENTS  4/28/19 14:45


 4/29/19 10:27 DC 4/29/19 08:17





 


 Piperacillin Sod/


 Tazobactam Sod


 2.25 gm/Sodium


 Chloride  50 ml @ 


 100 mls/hr  1X  ONCE


 IV


   4/28/19 14:45


 4/28/19 15:14 DC 4/28/19 15:10





 


 Vancomycin HCl


 1.75 gm/Sodium


 Chloride  500 ml @ 


 250 mls/hr  1X  ONCE


 IV


   4/28/19 14:45


 4/28/19 16:44 DC 4/28/19 15:51





 


 Ceftriaxone Sodium


  (Rocephin)  1 gm  Q24H


 IVP


   4/28/19 16:00


    4/28/19 15:12





 


 Benzonatate


  (Tessalon Perle)  100 mg  [id number]


 PO


   4/28/19 21:00


    4/29/19 10:00





 


 Albuterol/


 Ipratropium


  (Duoneb)  3 ml  RTQID


 NEB


   4/28/19 16:00


    4/29/19 12:02





 


 Guaifenesin


  (Robitussin Dm)  10 ml  PRN Q6HRS  PRN


 PO


 COUGH  4/28/19 14:45


    4/29/19 05:57





 


 Acetaminophen/


 Codeine Phosphate


  (Tylenol #3)  1 tab  PRN Q6HRS  PRN


 PO


 MODERATE PAIN  4/28/19 14:45


    4/29/19 09:57





 


 Morphine Sulfate


  (Morphine


 Sulfate)  2 mg  PRN Q2HR  PRN


 IV


 PAIN  4/28/19 14:45


    4/29/19 05:19





 


 Labetalol HCl


  (Normodyne Iv


 Push)  20 mg  PRN Q2HR  PRN


 IVP


 HYPERTENSION, SEE COMMENTS  4/28/19 14:45


    4/29/19 05:57





 


 Lidocaine


  (Lidoderm)  1 patch  DAILY


 TD


   4/28/19 15:00


    4/28/19 15:20





 


 Fentanyl Citrate


  (Fentanyl 2ml


 Vial)  50 mcg  1X  ONCE


 IV


   4/28/19 15:00


 4/28/19 15:01 DC 4/28/19 15:06





 


 Cetirizine HCl


  (ZyrTEC)  10 mg  DAILY


 PO


   4/28/19 15:00


    4/29/19 09:58





 


 Sodium Chloride  500 ml @ 


 500 mls/hr  1X  ONCE


 IV


   4/28/19 15:15


 4/28/19 16:14 DC 4/28/19 15:16





 


 Vancomycin HCl


  (Vancomycin


 Random Level)  1 each  1X  ONCE


 MC


   4/29/19 05:00


 4/29/19 05:01 DC 4/29/19 05:17





 


 Amlodipine


 Besylate


  (Norvasc)  5 mg  DAILY


 PO


   4/29/19 09:00


 4/29/19 14:01 DC 4/29/19 09:58





 


 Metoprolol


 Succinate


  (Toprol Xl)  25 mg  DAILY


 PO


   4/29/19 09:00


    4/29/19 09:57





 


 Sevelamer


 Carbonate


  (Renvela)  2,400 mg  TIDWMEALS


 PO


   4/28/19 17:00


    4/29/19 09:56





 


 Atorvastatin


 Calcium


  (Lipitor)  80 mg  QHS


 PO


   4/28/19 21:00


    4/28/19 20:59





 


 Budesonide


  (Pulmicort)  0.5 mg  RTBID


 NEB


   4/28/19 20:00


    4/29/19 07:18





 


 Calcium Acetate


  (Phoslo)  2,001 mg  TIDWMEALS


 PO


   4/28/19 17:00


    4/29/19 09:56





 


 Cinacalcet


  (Sensipar)  90 mg  QMWF@0900


 PO


   4/29/19 09:00


    4/29/19 09:57





 


 Tramadol HCl


  (Ultram)  100 mg  PRN Q6HRS  PRN


 PO


 PAIN  4/29/19 08:45


    4/29/19 13:00














ALLERGIES


ALLERGIES:  


Coded Allergies:  


     No Known Drug Allergies (Unverified , 1/7/16)





ROS


Review of System


14 point ROS conducted with pertinent positives noted above in HPI.





PHYSICAL EXAM


General:  Alert, Oriented X3, Cooperative, No acute distress


HEENT:  Atraumatic, Mucous membr. moist/pink


Lungs:  Clear to auscultation


Heart:  Regular rate, Normal S1, Normal S2, Other (3/6 systolic murmur )


Abdomen:  Soft, No tenderness


Extremities:  No edema, Normal pulses


Skin:  No significant lesion


Neuro:  Sensation intact


Psych/Mental Status:  Mental status NL, Mood NL


MUSCULOSKELETAL:  Osteoarthritic changes both hands





VITALS


VITALS





Vital Signs








  Date Time  Temp Pulse Resp B/P (MAP) Pulse Ox O2 Delivery O2 Flow Rate FiO2


 


4/29/19 11:00 97.9 67 16 172/97 (122) 92 Room Air  





 97.9       











LABS


Lab:





Laboratory Tests








Test


 4/28/19


19:40 4/29/19


05:15


 


Lactic Acid Level


 1.4 mmol/L


(0.4-2.0) 





 


Sodium Level


 


 142 mmol/L


(136-145)


 


Potassium Level


 


 4.1 mmol/L


(3.5-5.1)


 


Chloride Level


 


 99 mmol/L


()


 


Carbon Dioxide Level


 


 32 mmol/L


(21-32)


 


Anion Gap  11 (6-14) 


 


Blood Urea Nitrogen


 


 53 mg/dL


(8-26)


 


Creatinine


 


 13.9 mg/dL


(0.7-1.3)


 


Estimated GFR


(Cockcroft-Gault) 


 4.3 





 


Glucose Level


 


 106 mg/dL


(70-99)


 


Calcium Level


 


 9.7 mg/dL


(8.5-10.1)


 


Random Vancomycin Level  21.8 mcg/mL 


 


Hepatitis B Surface Antigen


 


 Nonreactive


(Nonreactive)











ECHOCARDIOGRAM


ECHOCARDIOGRAM


<Conclusion>


The left ventricular systolic function is normal.


The Ejection Fraction is 55-60%.


There is normal LV segmental wall motion.


Moderate to severe mitral regurgitation.


Mild tricuspid regurgitation.


There is moderate pulmonary hypertension.


The PA pressure was estimated at 58 mmHg.


There is no evidence of significant pericardial effusion.





DATE:     04/29/19 1111





ASSESSMENT/PLAN


ASSESSMENT/PLAN


1.  Accelerated hypertension; remains labile


2.  Acute on chronic diastolic HF; better compensated 


3.  Mitral regurgitation, moderate to severe 


4.  ESRD on HD


5.  H/o CVA


6.  Abdominal aortic aneurysm.


7.  Elevated troponin. Most probably type II, demand ischemic in the setting of 

renal disease. CP free.


8.  Acute sinusitis, URI


9.  Hyperlipidemia; statin





Recommendations





Fluid offloading/management via HD.


Continue BB.


Norvasc added


Add lisinopril if BP remains elevated. Hydralazine IV PRN


Outpatient workup for further evaluation of mitral regurg. Will defer to primary

cardiologist at .


Supportive care











JAMIN ROGERS           Apr 29, 2019 14:48

## 2019-04-29 NOTE — PDOC2
CONSULT


Date of Consult


Date of Consult


DATE: 4/29/19 


TIME: 15:21





Reason for Consult


Reason for Consult:


ESRD





Source


Source:  Chart review, Patient





History of Present Illness


Reason for Visit:


64-year-old -American male who came in because his eye hurts especially 

every time when he coughs. He also has been having watery eyes,  runny nose past

few days and acute onset 2-3 days back pain with no precipitating trauma or 

injury.


EMS , later 190s  systolic hence admitted. He is ESRD Monday Wednesday Friday and did not miss dialysis, last session was Friday 


CT head is negative for any acute intracranial hemorrhage positive for sinusitis

and mucosal thickening


Chest x-ray also shows emphysematous lungs-he is a previous smoker, and maybe 

infiltrates or haziness on the lower basis


Got empiric antibiotics at the emergency room.





He has been on HD for 10 yrs, goes to [facility] unit  . Currently states not 

feeling good, c/o back pain





Past Medical History


Cardiovascular:  HTN


Pulmonary:  Bronchitis, COPD


CENTRAL NERVOUS SYSTEM:  CVA


Renal/:  Chronic renal failure (ESRD on HD)





Past Surgical History


Past Surgical History:  Other (AV fistula)





Family History


Family History:  Hypertension





Social History


Quit


ALCOHOL:  none


Drugs:  None





Current Problem List


Problem List


Problems


Medical Problems:


(1) HCAP (healthcare-associated pneumonia)


Status: Acute  





(2) Hypertensive urgency


Status: Acute  





(3) Sinusitis


Status: Acute  











Current Medications


Current Medications





Current Medications


Clonidine HCl (Catapres) 0.2 mg 1X  ONCE PO  Last administered on 4/28/19at 

12:41;  Start 4/28/19 at 12:45;  Stop 4/28/19 at 12:46;  Status DC


Piperacillin Sod/ Tazobactam Sod 3.375 gm/Sodium Chloride 50 ml @  100 mls/hr 1X

 ONCE IV ;  Start 4/28/19 at 14:45;  Stop 4/28/19 at 15:14;  Status UNV


Vancomycin HCl (Vanco Per Pharmacy) 1 each PRN DAILY  PRN MC SEE COMMENTS Last 

administered on 4/29/19at 08:17;  Start 4/28/19 at 14:45;  Stop 4/29/19 at 

10:27;  Status DC


Morphine Sulfate (Morphine Sulfate) 4 mg 1X  ONCE IV ;  Start 4/28/19 at 14:45; 

Stop 4/28/19 at 14:46;  Status DC


Piperacillin Sod/ Tazobactam Sod 2.25 gm/Sodium Chloride 50 ml @  100 mls/hr 1X 

ONCE IV  Last administered on 4/28/19at 15:10;  Start 4/28/19 at 14:45;  Stop 

4/28/19 at 15:14;  Status DC


Vancomycin HCl 1.75 gm/Sodium Chloride 500 ml @  250 mls/hr 1X  ONCE IV  Last 

administered on 4/28/19at 15:51;  Start 4/28/19 at 14:45;  Stop 4/28/19 at 

16:44;  Status DC


Ceftriaxone Sodium (Rocephin) 1 gm Q24H IVP  Last administered on 4/28/19at 

15:12;  Start 4/28/19 at 16:00


Benzonatate (Tessalon Perle) 100 mg [id number] PO  Last administered on 4/29/19at 

10:00;  Start 4/28/19 at 21:00


Albuterol/ Ipratropium (Duoneb) 3 ml RTQID NEB  Last administered on 4/29/19at 

12:02;  Start 4/28/19 at 16:00


Guaifenesin (Robitussin Dm) 10 ml PRN Q6HRS  PRN PO COUGH Last administered on 

4/29/19at 05:57;  Start 4/28/19 at 14:45


Clonidine HCl (Catapres) 0.1 mg PRN Q1HR  PRN PO HYPERTENSION, SEE COMMENTS;  

Start 4/28/19 at 14:45


Temazepam (Restoril) 7.5 mg PRN QHS  PRN PO INSOMNIA;  Start 4/28/19 at 14:45


Ondansetron HCl (Zofran) 4 mg PRN Q6HRS  PRN IV NAUSEA/VOMITING;  Start 4/28/19 

at 14:45


Acetaminophen (Tylenol) 500 mg PRN Q6HRS  PRN PO MILD PAIN / TEMP;  Start 

4/28/19 at 14:45


Acetaminophen/ Codeine Phosphate (Tylenol #3) 1 tab PRN Q6HRS  PRN PO MODERATE 

PAIN Last administered on 4/29/19at 09:57;  Start 4/28/19 at 14:45


Morphine Sulfate (Morphine Sulfate) 2 mg PRN Q2HR  PRN IV PAIN Last administered

on 4/29/19at 05:19;  Start 4/28/19 at 14:45


Labetalol HCl (Normodyne Iv Push) 20 mg PRN Q2HR  PRN IVP HYPERTENSION, SEE 

COMMENTS Last administered on 4/29/19at 05:57;  Start 4/28/19 at 14:45


Lidocaine (Lidoderm) 1 patch DAILY TD  Last administered on 4/28/19at 15:20;  

Start 4/28/19 at 15:00


Fentanyl Citrate (Fentanyl 2ml Vial) 50 mcg 1X  ONCE IV  Last administered on 

4/28/19at 15:06;  Start 4/28/19 at 15:00;  Stop 4/28/19 at 15:01;  Status DC


Ondansetron HCl (Zofran) 4 mg PRN Q8HRS  PRN IV NAUSEA/VOMITING;  Start 4/28/19 

at 15:00;  Stop 4/29/19 at 14:59;  Status UNV


Cetirizine HCl (ZyrTEC) 10 mg DAILY PO  Last administered on 4/29/19at 09:58;  

Start 4/28/19 at 15:00


Labetalol HCl (Normodyne Iv Push) 20 mg 1X  ONCE IVP ;  Start 4/28/19 at 15:00; 

Stop 4/28/19 at 15:08;  Status DC


Tramadol HCl (Ultram) 50 mg PRN Q6HRS  PRN PO SEVERE PAIN;  Start 4/28/19 at 

15:15


Sodium Chloride 500 ml @  500 mls/hr 1X  ONCE IV  Last administered on 4/28/19at

15:16;  Start 4/28/19 at 15:15;  Stop 4/28/19 at 16:14;  Status DC


Vancomycin HCl 500 mg/Sodium Chloride 100 ml @  100 mls/hr MoWeFr@1600 IV ;  

Start 4/29/19 at 16:00;  Stop 4/29/19 at 16:00;  Status DC


Vancomycin HCl (Vancomycin Random Level) 1 each 1X  ONCE MC  Last administered 

on 4/29/19at 05:17;  Start 4/29/19 at 05:00;  Stop 4/29/19 at 05:01;  Status DC


Amlodipine Besylate (Norvasc) 5 mg DAILY PO  Last administered on 4/29/19at 

09:58;  Start 4/29/19 at 09:00;  Stop 4/29/19 at 14:01;  Status DC


Metoprolol Succinate (Toprol Xl) 25 mg DAILY PO  Last administered on 4/29/19at 

09:57;  Start 4/29/19 at 09:00


Sevelamer Carbonate (Renvela) 2,400 mg TIDWMEALS PO  Last administered on 

4/29/19at 09:56;  Start 4/28/19 at 17:00


Atorvastatin Calcium (Lipitor) 80 mg QHS PO  Last administered on 4/28/19at 

20:59;  Start 4/28/19 at 21:00


Budesonide (Pulmicort) 0.5 mg RTBID NEB  Last administered on 4/29/19at 07:18;  

Start 4/28/19 at 20:00


Calcium Acetate (Phoslo) 2,001 mg TIDWMEALS PO  Last administered on 4/29/19at 

09:56;  Start 4/28/19 at 17:00


Cinacalcet (Sensipar) 90 mg QMWF@0900 PO  Last administered on 4/29/19at 09:57; 

Start 4/29/19 at 09:00


Cinacalcet (Sensipar) 60 mg QTUTHSASU@0900 PO ;  Start 4/30/19 at 09:00


Non-Formulary Medication (Loratadine ) 10 mg PRN DAILY  PRN PO allergies;  Start

4/28/19 at 17:00;  Status UNV


Polyethylene Glycol (miraLAX PACKET) 17 gm PRN DAILY  PRN PO CONSTIPATION;  

Start 4/29/19 at 09:00


Tramadol HCl (Ultram) 100 mg PRN Q6HRS  PRN PO PAIN Last administered on 

4/29/19at 13:00;  Start 4/29/19 at 08:45


Sodium Chloride 1,000 ml @  1,000 mls/hr Q1H PRN IV hypotension;  Start 4/29/19 

at 12:52;  Stop 4/29/19 at 18:51


Diphenhydramine HCl (Benadryl) 25 mg 1X PRN  PRN IV ITCHING;  Start 4/29/19 at 

13:00;  Stop 4/30/19 at 12:59


Diphenhydramine HCl (Benadryl) 25 mg 1X PRN  PRN IV ITCHING;  Start 4/29/19 at 

13:00;  Stop 4/30/19 at 12:59


Sodium Chloride 1,000 ml @  400 mls/hr Q2H30M PRN IV PATENCY;  Start 4/29/19 at 

12:52;  Stop 4/30/19 at 00:51


Info (PHARMACY MONITORING -- do not chart) 1 each PRN DAILY  PRN MC SEE 

COMMENTS;  Start 4/29/19 at 13:00


Amlodipine Besylate (Norvasc) 10 mg DAILY PO ;  Start 4/30/19 at 09:00


Amlodipine Besylate (Norvasc) 5 mg 1X  ONCE PO ;  Start 4/29/19 at 14:00;  Stop 

4/29/19 at 14:03;  Status DC





Active Scripts


Active


Reported


Atorvastatin Calcium 80 Mg Tablet 1 Tab PO DAILY


Renvela (Sevelamer Carbonate) 800 Mg Tablet 3 Tab PO TIDWMEALS


Polyethylene Glycol 3350 17 Gm Powd.pack 1 Pkt PO PRN DAILY PRN


Cinacalcet HCl 60 Mg Tablet 1 Tab PO PRN ALT PRN


Loratadine 10 Mg Tablet 10 Mg PO PRN DAILY PRN


Calcium Acetate 667 Mg Tablet 3 Tab PO TIDBFRMEAL


Symbicort 160-4.5 Mcg Inhaler (Budesonide/Formoterol Fumarate) 10.2 Gm 

Hfa.aer.ad 2 Gm IH DAILY


Metoprolol Succinate ( Xl ) (Metoprolol Succinate) 25 Mg Tab.er.24h 25 Tab PO 

DAILY


Amlodipine Besylate 5 Mg Tablet 5 Tab PO DAILY


Sensipar (Cinacalcet Hcl) 90 Mg Tablet 90 Tab PO MWF





Allergies


Allergies:  


Coded Allergies:  


     No Known Drug Allergies (Unverified , 1/7/16)





ROS


Review of System


   As per HPI





Physical Exam


Physical Exam


General:  No acute distress, 


HEENT:  OM moist  


Neck Supple  


Lungs: diminished at the bases, no wheezing, Non labored  


Heart:  S1S2, RRR, ИРИНА +


Abdomen:  Normal bowel sounds, Soft, 


Extremities:  No clubbing, No cyanosis, No edema, 


Skin:  No rashes,


Neuro:  Normal gait, Normal speech, Strength at 5/5 X4 ext, Normal tone, Sens

ation intact, Cranial nerves 3-12 NL, Reflexes 2+


 No Castillo


Vital Signs





Vital Signs








  Date Time  Temp Pulse Resp B/P (MAP) Pulse Ox O2 Delivery O2 Flow Rate FiO2


 


4/29/19 11:00 97.9 67 16 172/97 (122) 92 Room Air  





 97.9       








Assessment & Plan


ESRD on HD MWF at [facility]  


Last Hd on friday


Seen on HD today, continue as ordered, [facility] RN  





Anemia- No Indication for SVEN currently





MBD- Continue Home Sensipar and Cinacalcet  





Hypertensive urgency   with blood pressure systolic over 230s of admission





Sinusitis/URI with pneumonia- primary managing  





Acute Onset back pain, left lumbar area





Mitral regurgitation, moderate to severe


Per Cardiology   





H/o CVA





Abdominal aortic aneurysm.





Labs


Labs





Laboratory Tests








Test


 4/28/19


12:30 4/28/19


12:54 4/28/19


13:18 4/28/19


14:15


 


White Blood Count


 9.6 x10^3/uL


(4.0-11.0) 


 


 





 


Red Blood Count


 3.94 x10^6/uL


(4.30-5.70) 


 


 





 


Hemoglobin


 11.0 g/dL


(13.0-17.5) 


 


 





 


Hematocrit


 34.0 %


(39.0-53.0) 


 


 





 


Mean Corpuscular Volume 86 fL ()    


 


Mean Corpuscular Hemoglobin 28 pg (25-35)    


 


Mean Corpuscular Hemoglobin


Concent 32 g/dL


(31-37) 


 


 





 


Red Cell Distribution Width


 18.0 %


(11.5-14.5) 


 


 





 


Platelet Count


 174 x10^3/uL


(140-400) 


 


 





 


Neutrophils (%) (Auto) 72 % (31-73)    


 


Lymphocytes (%) (Auto) 11 % (24-48)    


 


Monocytes (%) (Auto) 11 % (0-9)    


 


Eosinophils (%) (Auto) 4 % (0-3)    


 


Basophils (%) (Auto) 1 % (0-3)    


 


Neutrophils # (Auto)


 6.9 x10^3uL


(1.8-7.7) 


 


 





 


Lymphocytes # (Auto)


 1.1 x10^3/uL


(1.0-4.8) 


 


 





 


Monocytes # (Auto)


 1.1 x10^3/uL


(0.0-1.1) 


 


 





 


Eosinophils # (Auto)


 0.4 x10^3/uL


(0.0-0.7) 


 


 





 


Basophils # (Auto)


 0.1 x10^3/uL


(0.0-0.2) 


 


 





 


Prothrombin Time


 15.3 SEC


(11.7-14.0) 


 


 





 


Prothromb Time International


Ratio 1.2 (0.8-1.1) 


 


 


 





 


Activated Partial


Thromboplast Time 31 SEC (24-38) 


 


 


 





 


Troponin I Quantitative


 0.214 ng/mL


(0.000-0.055) 


 


 





 


NT-Pro-B-Type Natriuretic


Peptide > 88306 pg/mL


(0-124) 


 


 





 


Group A Streptococcus Rapid


 


 Positive


(NEGATIVE) 


 





 


Sodium Level


 


 


 141 mmol/L


(136-145) 





 


Potassium Level


 


 


 3.9 mmol/L


(3.5-5.1) 





 


Chloride Level


 


 


 96 mmol/L


() 





 


Carbon Dioxide Level


 


 


 33 mmol/L


(21-32) 





 


Anion Gap   12 (6-14)  


 


Blood Urea Nitrogen


 


 


 47 mg/dL


(8-26) 





 


Creatinine


 


 


 12.7 mg/dL


(0.7-1.3) 





 


Estimated GFR


(Cockcroft-Gault) 


 


 4.8 


 





 


BUN/Creatinine Ratio   4 (6-20)  


 


Glucose Level


 


 


 99 mg/dL


(70-99) 





 


Calcium Level


 


 


 10.3 mg/dL


(8.5-10.1) 





 


Total Bilirubin


 


 


 0.7 mg/dL


(0.2-1.0) 





 


Aspartate Amino Transf


(AST/SGOT) 


 


 17 U/L (15-37) 


 





 


Alanine Aminotransferase


(ALT/SGPT) 


 


 10 U/L (16-63) 


 





 


Alkaline Phosphatase


 


 


 129 U/L


() 





 


Total Protein


 


 


 8.5 g/dL


(6.4-8.2) 





 


Albumin


 


 


 3.3 g/dL


(3.4-5.0) 





 


Albumin/Globulin Ratio   0.6 (1.0-1.7)  


 


Lactic Acid Level


 


 


 


 1.3 mmol/L


(0.4-2.0)


 


Test


 4/28/19


19:40 4/29/19


05:15 


 





 


Lactic Acid Level


 1.4 mmol/L


(0.4-2.0) 


 


 





 


Sodium Level


 


 142 mmol/L


(136-145) 


 





 


Potassium Level


 


 4.1 mmol/L


(3.5-5.1) 


 





 


Chloride Level


 


 99 mmol/L


() 


 





 


Carbon Dioxide Level


 


 32 mmol/L


(21-32) 


 





 


Anion Gap  11 (6-14)   


 


Blood Urea Nitrogen


 


 53 mg/dL


(8-26) 


 





 


Creatinine


 


 13.9 mg/dL


(0.7-1.3) 


 





 


Estimated GFR


(Cockcroft-Gault) 


 4.3 


 


 





 


Glucose Level


 


 106 mg/dL


(70-99) 


 





 


Calcium Level


 


 9.7 mg/dL


(8.5-10.1) 


 





 


Random Vancomycin Level  21.8 mcg/mL   


 


Hepatitis B Surface Antigen


 


 Nonreactive


(Nonreactive) 


 











Laboratory Tests








Test


 4/28/19


19:40 4/29/19


05:15


 


Lactic Acid Level


 1.4 mmol/L


(0.4-2.0) 





 


Sodium Level


 


 142 mmol/L


(136-145)


 


Potassium Level


 


 4.1 mmol/L


(3.5-5.1)


 


Chloride Level


 


 99 mmol/L


()


 


Carbon Dioxide Level


 


 32 mmol/L


(21-32)


 


Anion Gap  11 (6-14) 


 


Blood Urea Nitrogen


 


 53 mg/dL


(8-26)


 


Creatinine


 


 13.9 mg/dL


(0.7-1.3)


 


Estimated GFR


(Cockcroft-Gault) 


 4.3 





 


Glucose Level


 


 106 mg/dL


(70-99)


 


Calcium Level


 


 9.7 mg/dL


(8.5-10.1)


 


Random Vancomycin Level  21.8 mcg/mL 


 


Hepatitis B Surface Antigen


 


 Nonreactive


(Nonreactive)








Review


All relevant outside records, renal labs, imaging studies, telemetry/EKG's were 

reviewed.





Images


Images


MPRESSION:


1. No fracture noted in the lumbar spine.


2. Abdominal aortic aneurysm.


3. Probable phlebolith versus ureteral calculus on the right.








1.  There are some infiltrates of the lung bases bilaterally, no previous 


exam to evaluate for change. There is emphysema.











AMENA SANDRA MD                Apr 29, 2019 15:31

## 2019-04-29 NOTE — PDOC
PROGRESS NOTES


Chief Complaint


Chief Complaint





Hypertensive emergency with blood pressure systolic over 230s of admission


Anemia of ESRD


ESRD does is Monday Wednesday Friday-did not miss any dialysis sessions


Sinusitis


URI with pneumonia-empiric Rocephin


 systolic murmur left sternal border 


Acute Onset back pain, left lumbar area


Leukocytosis/Sepsis-tachycardic, tachypneic, leukocytosis on admission





History of Present Illness


History of Present Illness


 


Labetalol  prn 


 echo showed mod/severe mitral regurg -  cv consult


 


PT OT


discussed with physiatry


 


  Rocephin for sinusitis





Vitals


Vitals





Vital Signs








  Date Time  Temp Pulse Resp B/P (MAP) Pulse Ox O2 Delivery O2 Flow Rate FiO2


 


4/29/19 11:00 97.9 67 16 172/97 (122) 92 Room Air  





 97.9       











Physical Exam


General:  Alert, Oriented X3, Cooperative, No acute distress, Other (has tissue 

packing on his left nostril from runny nose, nasal voice)


Abdomen:  Normal bowel sounds, Soft, No tenderness, No hepatosplenomegaly, No 

masses


Extremities:  No clubbing, No cyanosis, No edema, Normal pulses, No ten

derness/swelling


Skin:  No rashes, No breakdown, No significant lesion





Labs


LABS





Laboratory Tests








Test


 4/28/19


14:15 4/28/19


19:40 4/29/19


05:15


 


Lactic Acid Level


 1.3 mmol/L


(0.4-2.0) 1.4 mmol/L


(0.4-2.0) 





 


Sodium Level


 


 


 142 mmol/L


(136-145)


 


Potassium Level


 


 


 4.1 mmol/L


(3.5-5.1)


 


Chloride Level


 


 


 99 mmol/L


()


 


Carbon Dioxide Level


 


 


 32 mmol/L


(21-32)


 


Anion Gap   11 (6-14) 


 


Blood Urea Nitrogen


 


 


 53 mg/dL


(8-26)


 


Creatinine


 


 


 13.9 mg/dL


(0.7-1.3)


 


Estimated GFR


(Cockcroft-Gault) 


 


 4.3 





 


Glucose Level


 


 


 106 mg/dL


(70-99)


 


Calcium Level


 


 


 9.7 mg/dL


(8.5-10.1)


 


Random Vancomycin Level   21.8 mcg/mL 


 


Hepatitis B Surface Antigen


 


 


 Nonreactive


(Nonreactive)











Assessment and Plan


Assessmemt and Plan


Problems


Medical Problems:


(1) HCAP (healthcare-associated pneumonia)


Status: Acute  





(2) Hypertensive urgency


Status: Acute  





(3) Sinusitis


Status: Acute  











Comment


Review of Relevant


I have reviewed the following items lorna (where applicable) has been applied.


Labs





Laboratory Tests








Test


 4/28/19


12:30 4/28/19


12:54 4/28/19


13:18 4/28/19


14:15


 


White Blood Count


 9.6 x10^3/uL


(4.0-11.0) 


 


 





 


Red Blood Count


 3.94 x10^6/uL


(4.30-5.70) 


 


 





 


Hemoglobin


 11.0 g/dL


(13.0-17.5) 


 


 





 


Hematocrit


 34.0 %


(39.0-53.0) 


 


 





 


Mean Corpuscular Volume 86 fL ()    


 


Mean Corpuscular Hemoglobin 28 pg (25-35)    


 


Mean Corpuscular Hemoglobin


Concent 32 g/dL


(31-37) 


 


 





 


Red Cell Distribution Width


 18.0 %


(11.5-14.5) 


 


 





 


Platelet Count


 174 x10^3/uL


(140-400) 


 


 





 


Neutrophils (%) (Auto) 72 % (31-73)    


 


Lymphocytes (%) (Auto) 11 % (24-48)    


 


Monocytes (%) (Auto) 11 % (0-9)    


 


Eosinophils (%) (Auto) 4 % (0-3)    


 


Basophils (%) (Auto) 1 % (0-3)    


 


Neutrophils # (Auto)


 6.9 x10^3uL


(1.8-7.7) 


 


 





 


Lymphocytes # (Auto)


 1.1 x10^3/uL


(1.0-4.8) 


 


 





 


Monocytes # (Auto)


 1.1 x10^3/uL


(0.0-1.1) 


 


 





 


Eosinophils # (Auto)


 0.4 x10^3/uL


(0.0-0.7) 


 


 





 


Basophils # (Auto)


 0.1 x10^3/uL


(0.0-0.2) 


 


 





 


Prothrombin Time


 15.3 SEC


(11.7-14.0) 


 


 





 


Prothromb Time International


Ratio 1.2 (0.8-1.1) 


 


 


 





 


Activated Partial


Thromboplast Time 31 SEC (24-38) 


 


 


 





 


Troponin I Quantitative


 0.214 ng/mL


(0.000-0.055) 


 


 





 


NT-Pro-B-Type Natriuretic


Peptide > 99852 pg/mL


(0-124) 


 


 





 


Group A Streptococcus Rapid


 


 Positive


(NEGATIVE) 


 





 


Sodium Level


 


 


 141 mmol/L


(136-145) 





 


Potassium Level


 


 


 3.9 mmol/L


(3.5-5.1) 





 


Chloride Level


 


 


 96 mmol/L


() 





 


Carbon Dioxide Level


 


 


 33 mmol/L


(21-32) 





 


Anion Gap   12 (6-14)  


 


Blood Urea Nitrogen


 


 


 47 mg/dL


(8-26) 





 


Creatinine


 


 


 12.7 mg/dL


(0.7-1.3) 





 


Estimated GFR


(Cockcroft-Gault) 


 


 4.8 


 





 


BUN/Creatinine Ratio   4 (6-20)  


 


Glucose Level


 


 


 99 mg/dL


(70-99) 





 


Calcium Level


 


 


 10.3 mg/dL


(8.5-10.1) 





 


Total Bilirubin


 


 


 0.7 mg/dL


(0.2-1.0) 





 


Aspartate Amino Transf


(AST/SGOT) 


 


 17 U/L (15-37) 


 





 


Alanine Aminotransferase


(ALT/SGPT) 


 


 10 U/L (16-63) 


 





 


Alkaline Phosphatase


 


 


 129 U/L


() 





 


Total Protein


 


 


 8.5 g/dL


(6.4-8.2) 





 


Albumin


 


 


 3.3 g/dL


(3.4-5.0) 





 


Albumin/Globulin Ratio   0.6 (1.0-1.7)  


 


Lactic Acid Level


 


 


 


 1.3 mmol/L


(0.4-2.0)


 


Test


 4/28/19


19:40 4/29/19


05:15 


 





 


Lactic Acid Level


 1.4 mmol/L


(0.4-2.0) 


 


 





 


Sodium Level


 


 142 mmol/L


(136-145) 


 





 


Potassium Level


 


 4.1 mmol/L


(3.5-5.1) 


 





 


Chloride Level


 


 99 mmol/L


() 


 





 


Carbon Dioxide Level


 


 32 mmol/L


(21-32) 


 





 


Anion Gap  11 (6-14)   


 


Blood Urea Nitrogen


 


 53 mg/dL


(8-26) 


 





 


Creatinine


 


 13.9 mg/dL


(0.7-1.3) 


 





 


Estimated GFR


(Cockcroft-Gault) 


 4.3 


 


 





 


Glucose Level


 


 106 mg/dL


(70-99) 


 





 


Calcium Level


 


 9.7 mg/dL


(8.5-10.1) 


 





 


Random Vancomycin Level  21.8 mcg/mL   


 


Hepatitis B Surface Antigen


 


 Nonreactive


(Nonreactive) 


 











Laboratory Tests








Test


 4/28/19


14:15 4/28/19


19:40 4/29/19


05:15


 


Lactic Acid Level


 1.3 mmol/L


(0.4-2.0) 1.4 mmol/L


(0.4-2.0) 





 


Sodium Level


 


 


 142 mmol/L


(136-145)


 


Potassium Level


 


 


 4.1 mmol/L


(3.5-5.1)


 


Chloride Level


 


 


 99 mmol/L


()


 


Carbon Dioxide Level


 


 


 32 mmol/L


(21-32)


 


Anion Gap   11 (6-14) 


 


Blood Urea Nitrogen


 


 


 53 mg/dL


(8-26)


 


Creatinine


 


 


 13.9 mg/dL


(0.7-1.3)


 


Estimated GFR


(Cockcroft-Gault) 


 


 4.3 





 


Glucose Level


 


 


 106 mg/dL


(70-99)


 


Calcium Level


 


 


 9.7 mg/dL


(8.5-10.1)


 


Random Vancomycin Level   21.8 mcg/mL 


 


Hepatitis B Surface Antigen


 


 


 Nonreactive


(Nonreactive)








Medications





Current Medications


Clonidine HCl (Catapres) 0.2 mg 1X  ONCE PO  Last administered on 4/28/19at 

12:41;  Start 4/28/19 at 12:45;  Stop 4/28/19 at 12:46;  Status DC


Piperacillin Sod/ Tazobactam Sod 3.375 gm/Sodium Chloride 50 ml @  100 mls/hr 1X

 ONCE IV ;  Start 4/28/19 at 14:45;  Stop 4/28/19 at 15:14;  Status UNV


Vancomycin HCl (Vanco Per Pharmacy) 1 each PRN DAILY  PRN MC SEE COMMENTS Last 

administered on 4/29/19at 08:17;  Start 4/28/19 at 14:45;  Stop 4/29/19 at 

10:27;  Status DC


Morphine Sulfate (Morphine Sulfate) 4 mg 1X  ONCE IV ;  Start 4/28/19 at 14:45; 

Stop 4/28/19 at 14:46;  Status DC


Piperacillin Sod/ Tazobactam Sod 2.25 gm/Sodium Chloride 50 ml @  100 mls/hr 1X 

ONCE IV  Last administered on 4/28/19at 15:10;  Start 4/28/19 at 14:45;  Stop 

4/28/19 at 15:14;  Status DC


Vancomycin HCl 1.75 gm/Sodium Chloride 500 ml @  250 mls/hr 1X  ONCE IV  Last 

administered on 4/28/19at 15:51;  Start 4/28/19 at 14:45;  Stop 4/28/19 at 

16:44;  Status DC


Ceftriaxone Sodium (Rocephin) 1 gm Q24H IVP  Last administered on 4/28/19at 

15:12;  Start 4/28/19 at 16:00


Benzonatate (Tessalon Perle) 100 mg [id number] PO  Last administered on 4/29/19at 

10:00;  Start 4/28/19 at 21:00


Albuterol/ Ipratropium (Duoneb) 3 ml RTQID NEB  Last administered on 4/29/19at 

12:02;  Start 4/28/19 at 16:00


Guaifenesin (Robitussin Dm) 10 ml PRN Q6HRS  PRN PO COUGH Last administered on 

4/29/19at 05:57;  Start 4/28/19 at 14:45


Clonidine HCl (Catapres) 0.1 mg PRN Q1HR  PRN PO HYPERTENSION, SEE COMMENTS;  

Start 4/28/19 at 14:45


Temazepam (Restoril) 7.5 mg PRN QHS  PRN PO INSOMNIA;  Start 4/28/19 at 14:45


Ondansetron HCl (Zofran) 4 mg PRN Q6HRS  PRN IV NAUSEA/VOMITING;  Start 4/28/19 

at 14:45


Acetaminophen (Tylenol) 500 mg PRN Q6HRS  PRN PO MILD PAIN / TEMP;  Start 

4/28/19 at 14:45


Acetaminophen/ Codeine Phosphate (Tylenol #3) 1 tab PRN Q6HRS  PRN PO MODERATE 

PAIN Last administered on 4/29/19at 09:57;  Start 4/28/19 at 14:45


Morphine Sulfate (Morphine Sulfate) 2 mg PRN Q2HR  PRN IV PAIN Last administered

on 4/29/19at 05:19;  Start 4/28/19 at 14:45


Labetalol HCl (Normodyne Iv Push) 20 mg PRN Q2HR  PRN IVP HYPERTENSION, SEE 

COMMENTS Last administered on 4/29/19at 05:57;  Start 4/28/19 at 14:45


Lidocaine (Lidoderm) 1 patch DAILY TD  Last administered on 4/28/19at 15:20;  

Start 4/28/19 at 15:00


Fentanyl Citrate (Fentanyl 2ml Vial) 50 mcg 1X  ONCE IV  Last administered on 4/ 28/19at 15:06;  Start 4/28/19 at 15:00;  Stop 4/28/19 at 15:01;  Status DC


Ondansetron HCl (Zofran) 4 mg PRN Q8HRS  PRN IV NAUSEA/VOMITING;  Start 4/28/19 

at 15:00;  Stop 4/29/19 at 14:59;  Status UNV


Cetirizine HCl (ZyrTEC) 10 mg DAILY PO  Last administered on 4/29/19at 09:58;  

Start 4/28/19 at 15:00


Labetalol HCl (Normodyne Iv Push) 20 mg 1X  ONCE IVP ;  Start 4/28/19 at 15:00; 

Stop 4/28/19 at 15:08;  Status DC


Tramadol HCl (Ultram) 50 mg PRN Q6HRS  PRN PO SEVERE PAIN;  Start 4/28/19 at 

15:15


Sodium Chloride 500 ml @  500 mls/hr 1X  ONCE IV  Last administered on 4/28/19at

15:16;  Start 4/28/19 at 15:15;  Stop 4/28/19 at 16:14;  Status DC


Vancomycin HCl 500 mg/Sodium Chloride 100 ml @  100 mls/hr MoWeFr@1600 IV ;  

Start 4/29/19 at 16:00;  Stop 4/29/19 at 16:00;  Status DC


Vancomycin HCl (Vancomycin Random Level) 1 each 1X  ONCE MC  Last administered 

on 4/29/19at 05:17;  Start 4/29/19 at 05:00;  Stop 4/29/19 at 05:01;  Status DC


Amlodipine Besylate (Norvasc) 5 mg DAILY PO  Last administered on 4/29/19at 09:5

8;  Start 4/29/19 at 09:00


Metoprolol Succinate (Toprol Xl) 25 mg DAILY PO  Last administered on 4/29/19at 

09:57;  Start 4/29/19 at 09:00


Sevelamer Carbonate (Renvela) 2,400 mg TIDWMEALS PO  Last administered on 

4/29/19at 09:56;  Start 4/28/19 at 17:00


Atorvastatin Calcium (Lipitor) 80 mg QHS PO  Last administered on 4/28/19at 

20:59;  Start 4/28/19 at 21:00


Budesonide (Pulmicort) 0.5 mg RTBID NEB  Last administered on 4/29/19at 07:18;  

Start 4/28/19 at 20:00


Calcium Acetate (Phoslo) 2,001 mg TIDWMEALS PO  Last administered on 4/29/19at 

09:56;  Start 4/28/19 at 17:00


Cinacalcet (Sensipar) 90 mg QMWF@0900 PO  Last administered on 4/29/19at 09:57; 

Start 4/29/19 at 09:00


Cinacalcet (Sensipar) 60 mg QTUTHSASU@0900 PO ;  Start 4/30/19 at 09:00


Non-Formulary Medication (Loratadine ) 10 mg PRN DAILY  PRN PO allergies;  Start

4/28/19 at 17:00;  Status UNV


Polyethylene Glycol (miraLAX PACKET) 17 gm PRN DAILY  PRN PO CONSTIPATION;  

Start 4/29/19 at 09:00


Tramadol HCl (Ultram) 100 mg PRN Q6HRS  PRN PO PAIN Last administered on 

4/29/19at 13:00;  Start 4/29/19 at 08:45


Sodium Chloride 1,000 ml @  1,000 mls/hr Q1H PRN IV hypotension;  Start 4/29/19 

at 12:52;  Stop 4/29/19 at 18:51


Diphenhydramine HCl (Benadryl) 25 mg 1X PRN  PRN IV ITCHING;  Start 4/29/19 at 

13:00;  Stop 4/30/19 at 12:59


Diphenhydramine HCl (Benadryl) 25 mg 1X PRN  PRN IV ITCHING;  Start 4/29/19 at 

13:00;  Stop 4/30/19 at 12:59


Sodium Chloride 1,000 ml @  400 mls/hr Q2H30M PRN IV PATENCY;  Start 4/29/19 at 

12:52;  Stop 4/30/19 at 00:51


Info (PHARMACY MONITORING -- do not chart) 1 each PRN DAILY  PRN MC SEE 

COMMENTS;  Start 4/29/19 at 13:00





Active Scripts


Active


Reported


Atorvastatin Calcium 80 Mg Tablet 1 Tab PO DAILY


Renvela (Sevelamer Carbonate) 800 Mg Tablet 3 Tab PO TIDWMEALS


Polyethylene Glycol 3350 17 Gm Powd.pack 1 Pkt PO PRN DAILY PRN


Cinacalcet HCl 60 Mg Tablet 1 Tab PO PRN ALT PRN


Loratadine 10 Mg Tablet 10 Mg PO PRN DAILY PRN


Calcium Acetate 667 Mg Tablet 3 Tab PO TIDBFRMEAL


Symbicort 160-4.5 Mcg Inhaler (Budesonide/Formoterol Fumarate) 10.2 Gm 

Hfa.aer.ad 2 Gm IH DAILY


Metoprolol Succinate ( Xl ) (Metoprolol Succinate) 25 Mg Tab.er.24h 25 Tab PO 

DAILY


Amlodipine Besylate 5 Mg Tablet 5 Tab PO DAILY


Sensipar (Cinacalcet Hcl) 90 Mg Tablet 90 Tab PO [facility]


Vitals/I & O





Vital Sign - Last 24 Hours








 4/28/19 4/28/19 4/28/19 4/28/19





 14:24 14:50 15:06 15:13


 


Pulse 96 94  100


 


Resp 18 18 18 


 


B/P (MAP) 193/118 (143) 209/126 (153)  209/126


 


Pulse Ox 95 94 95 





 4/28/19 4/28/19 4/28/19 4/28/19





 15:20 16:00 16:05 16:27


 


Temp  98.1  





  98.1  


 


Pulse 94 87  


 


Resp 20 18  


 


B/P (MAP) 196/116 (142) 182/109 (133)  


 


Pulse Ox 94 95 94 


 


O2 Delivery Room Air Room Air Room Air Room Air


 


    





    





 4/28/19 4/28/19 4/28/19 4/28/19





 17:28 19:00 19:47 20:10


 


Temp  99.0  





  99.0  


 


Pulse 87 82  


 


Resp  18  


 


B/P (MAP) 182/109 157/89 (111)  


 


Pulse Ox  94  


 


O2 Delivery  Room Air Room Air Room Air


 


    





    





 4/28/19 4/28/19 4/29/19 4/29/19





 20:11 23:00 03:00 05:19


 


Temp  98.8 98.8 





  98.8 98.8 


 


Pulse  76 82 


 


Resp  18 18 


 


B/P (MAP)  135/79 (97) 169/99 (122) 


 


Pulse Ox  93 96 


 


O2 Delivery Room Air Room Air Room Air Room Air


 


    





    





 4/29/19 4/29/19 4/29/19 4/29/19





 05:56 05:57 07:00 07:18


 


Temp   97.6 





   97.6 


 


Pulse  82 80 


 


Resp   18 


 


B/P (MAP)  169/99 173/104 (127) 


 


Pulse Ox   97 96


 


O2 Delivery Room Air  Room Air Room Air


 


    





    





 4/29/19 4/29/19 4/29/19 4/29/19





 08:00 09:57 09:58 11:00


 


Temp    97.9





    97.9


 


Pulse  80 80 67


 


Resp    16


 


B/P (MAP)  173/104 173/104 172/97 (122)


 


Pulse Ox    92


 


O2 Delivery Room Air   Room Air














Intake and Output0   


 


 4/28/19 4/28/19 4/29/19





 15:00 23:00 07:00


 


Intake Total  300 ml 150 ml


 


Balance  300 ml 150 ml

















MAIK THOMAS MD                 Apr 29, 2019 13:58

## 2019-04-29 NOTE — EKG
[facility]

              [address] [place] [address]

Test Date:    2019               Test Time:    12:44:52

Pat Name:     KOKI TILLMAN               Department:   

Patient ID:   [id number]           Room:         671 1

Gender:       M                        Technician:   

:          [id number]               Requested By: DIONNA SWENSON

Order Number: [id number]           Reading MD:   Gage Barton

                                 Measurements

Intervals                              Axis          

Rate:         105                      P:            6

GA:           182                      QRS:          -8

QRSD:         102                      T:            57

QT:           356                                    

QTc:          475                                    

                           Interpretive Statements

SINUS TACHYCARDIA

VENTRICULAR PREMATURE COMPLEX(ES)

LEFT ATRIAL ABNORMALITY

LEFTWARD AXIS



Electronically Signed On 2019 17:40:25 CDT by Gage Barton

## 2019-04-30 VITALS — DIASTOLIC BLOOD PRESSURE: 93 MMHG | SYSTOLIC BLOOD PRESSURE: 157 MMHG

## 2019-04-30 VITALS — DIASTOLIC BLOOD PRESSURE: 99 MMHG | SYSTOLIC BLOOD PRESSURE: 167 MMHG

## 2019-04-30 VITALS — SYSTOLIC BLOOD PRESSURE: 177 MMHG | DIASTOLIC BLOOD PRESSURE: 102 MMHG

## 2019-04-30 RX ADMIN — IPRATROPIUM BROMIDE AND ALBUTEROL SULFATE SCH ML: .5; 3 SOLUTION RESPIRATORY (INHALATION) at 08:02

## 2019-04-30 RX ADMIN — BENZONATATE SCH MG: 100 CAPSULE, LIQUID FILLED ORAL at 09:00

## 2019-04-30 RX ADMIN — CALCIUM ACETATE SCH MG: 667 CAPSULE ORAL at 08:18

## 2019-04-30 RX ADMIN — LIDOCAINE SCH PATCH: 50 PATCH CUTANEOUS at 09:38

## 2019-04-30 RX ADMIN — BUDESONIDE SCH MG: 0.5 INHALANT RESPIRATORY (INHALATION) at 08:02

## 2019-04-30 RX ADMIN — Medication SCH CAP: at 09:00

## 2019-04-30 RX ADMIN — SEVELAMER CARBONATE SCH MG: 800 TABLET, FILM COATED ORAL at 08:19

## 2019-04-30 RX ADMIN — LABETALOL HYDROCHLORIDE PRN MG: 5 INJECTION, SOLUTION INTRAVENOUS at 04:54

## 2019-04-30 RX ADMIN — ACETAMINOPHEN AND CODEINE PHOSPHATE PRN TAB: 300; 30 TABLET ORAL at 04:55

## 2019-04-30 RX ADMIN — IPRATROPIUM BROMIDE AND ALBUTEROL SULFATE SCH ML: .5; 3 SOLUTION RESPIRATORY (INHALATION) at 11:30

## 2019-04-30 NOTE — PDOC
CARDIO Progress Notes


Date and Time


Date of Service


4/30/2019


Time of Evaluation


1120





Subjective


Subjective:  No Chest Pain, No shortness of breath, No Palpitations





Vitals


Vitals





Vital Signs








  Date Time  Temp Pulse Resp B/P (MAP) Pulse Ox O2 Delivery O2 Flow Rate FiO2


 


4/30/19 11:00 98.0 72 18 157/93 (114) 94 Room Air  





 98.0       


 


4/30/19 04:55       2.0 








Weight


Weight [ ]





Input and Output


Intake and Output











Intake and Output 


 


 4/30/19





 07:00


 


Intake Total 660 ml


 


Balance 660 ml


 


 


 


Intake Oral 660 ml


 


# Voids 2











Microbiology


Micro





Microbiology


4/28/19 Blood Culture - Preliminary, Resulted


          NO GROWTH AFTER 1 DAY





Physical Exam


HEENT:  Neck Supple W Full Motion


Chest:  Symmetric


LUNGS:  Clear to Auscultation


Heart:  S1S2, RRR (SR), other (I hear a grade 4 systolic murmur left sternal 

border, sinus tachycardia 90s)


Abdomen:  Soft N/T


Extremities:  No Calf Tenderness


Neurology:  alert, oriented, follow commands





Assessment


Assessment


1.  Accelerated hypertension; improved


2.  Acute on chronic diastolic CHF; compensated


3.  Mitral regurgitation, moderate to severe. EF and WM nml


4.  ESRD on HD


5.  H/o CVA


6.  Abdominal aortic aneurysm.


7.  Elevated troponin. Most probably type II, demand ischemic in the setting of 

renal disease. CP free.


8.  Acute sinusitis, URI


9.  Hyperlipidemia; statin





Recommendations


Fluid offloading/management via HD.


Continue BB, may change to coreg from toprol if BP remains labile.. Agree with 

norvasc. 


Outpatient workup for further evaluation of mitral regurg. Will defer to primary

cardiologist at .











ALYSA VELIZ          Apr 30, 2019 13:42

## 2019-04-30 NOTE — CONS
DATE OF CONSULTATION:  04/29/2019



ATTENDING PHYSICIAN:   _____. 



REASON FOR CONSULTATION:  The patient was seen at the request of Dr. Bianchi for

rehab evaluation about his back pain.



HISTORY OF PRESENT ILLNESS:  This is a 64-year-old right-handed male on

disability.  The patient with end-stage renal disease, on hemodialysis for about

10 years, admitted on 04/28/2019 with pain, bull's eyes and also lower back pain

started about 2-3 days ago without any specific injury.  The patient was found

by emergency medical personnel with blood pressure systolic in 230s.  The

patient usually gets hemodialysis on Mondays, Wednesdays and Fridays.  He did

not miss any dialysis.  His creatinine being 12.67, normal bicarbonate and s

potassium.  He had sinusitis and mucosal thickening on CT scan of the brain. 

Chest x-ray revealed emphysematous changes.  He is a previous smoker.  His

troponin being 0.2.  The patient was noted with a BNP of 35,000.



PAST MEDICAL HISTORY:  Includes hypertension, bronchitis, chronic obstructive

pulmonary disease, chronic renal insufficiency, status post AV fistula

placement.



FAMILY HISTORY:  Hypertension.  



SOCIAL HISTORY:  He quit smoking.  The patient lives alone, usually walks

without any assistance inside his high-rise apartment and uses an electric

scooter to get around outside his house.  The patient denies any radiation of

back pain to the extremities.  He denies any numbness, tingling sensation in the

extremities.  He denies any trouble with his bowel control.



PHYSICAL EXAMINATION:  Today revealed a middle-aged male.  He is alert and

oriented to time, place, person and circumstance and follows commands

appropriately, moves all 4 extremities voluntarily.  The patient had 5/5 grade

muscle strength in his extremities.  Deep tendon reflexes are brisk bilaterally.

 He had equal perception of touch and pinprick sensation bilaterally.  Straight

leg raising test is negative bilaterally.  He had localized tenderness to

palpation over left sacroiliac joint area.  Mild crepitus on range of motion of

his knee joints without any obvious knee joint effusion.  He is independent with

bed mobility and transfers.  I have not tested his ambulation skills except he

walked from the bed to the wheelchair without any difficulty.  No lumbar

paraspinal muscle spasm was noted at this time.



ASSESSMENT:

1.  A middle-aged male with subacute lumbar sprain with probable associated

degenerative disk disease of lumbar vertebrae without any clinical evidence of

ongoing lumbar radiculopathy.

2.  Degenerative joint disease of both knees without any significant pain.

3.  Hypertension.

4.  Chronic renal failure, on hemodialysis.

5.  History of old cerebrovascular accident.

6.  Chronic obstructive pulmonary disease and bronchitis.



RECOMMENDATIONS:  To try physical modalities, to instruct in proper body

mechanics, home when medically stable with outpatient followup to consider

injecting painful left sacroiliac joint area if the pain persists.  I thought of

starting him on anti-inflammatory medication but with his hypertension, not

under good control to hold up.  Dr. Bianchi, appreciate asking me to participate

in the care of this interesting patient.  I will be glad to follow him with you

as needed for the rehabilitation.

 



______________________________

CAROL ALFRED MD



DR:  SOFÍA/nts  JOB#:  [id number] / [id number]

DD:  04/29/2019 08:54  DT:  04/30/2019 01:39

## 2019-04-30 NOTE — PDOC
PROGRESS NOTES


Subjective


Subjective


He c/o nausea this AM.Low back pain is less.





Objective


Objective





Vital Signs








  Date Time  Temp Pulse Resp B/P (MAP) Pulse Ox O2 Delivery O2 Flow Rate FiO2


 


4/30/19 08:04     98 Room Air  


 


4/30/19 07:00 97.8 73 18 167/99 (121)    





 97.8       


 


4/30/19 04:55       2.0 














Intake and Output 


 


 4/30/19





 07:00


 


Intake Total 660 ml


 


Balance 660 ml


 


 


 


Intake Oral 660 ml


 


# Voids 2











Physical Exam


Physical Exam


He is alert,supine in bed and he continues to be independent with his mobility 

at roller walker level and most of his ADLs He continues with tenderness to 

palpation over left sacroiliac joint area.





Assessment


Assessment


Problems


Medical Problems:


(1) HCAP (healthcare-associated pneumonia)


Status: Acute  





(2) Hypertensive urgency


Status: Acute  





(3) Sinusitis


Status: Acute  











Plan


Plan of Care


To continue physical modalities and home when medically stable.





Comment


Review of Relevant


I have reviewed the following items lorna (where applicable) has been applied.


Labs





Laboratory Tests








Test


 4/28/19


12:30 4/28/19


12:54 4/28/19


13:18 4/28/19


14:15


 


White Blood Count


 9.6 x10^3/uL


(4.0-11.0) 


 


 





 


Red Blood Count


 3.94 x10^6/uL


(4.30-5.70) 


 


 





 


Hemoglobin


 11.0 g/dL


(13.0-17.5) 


 


 





 


Hematocrit


 34.0 %


(39.0-53.0) 


 


 





 


Mean Corpuscular Volume 86 fL ()    


 


Mean Corpuscular Hemoglobin 28 pg (25-35)    


 


Mean Corpuscular Hemoglobin


Concent 32 g/dL


(31-37) 


 


 





 


Red Cell Distribution Width


 18.0 %


(11.5-14.5) 


 


 





 


Platelet Count


 174 x10^3/uL


(140-400) 


 


 





 


Neutrophils (%) (Auto) 72 % (31-73)    


 


Lymphocytes (%) (Auto) 11 % (24-48)    


 


Monocytes (%) (Auto) 11 % (0-9)    


 


Eosinophils (%) (Auto) 4 % (0-3)    


 


Basophils (%) (Auto) 1 % (0-3)    


 


Neutrophils # (Auto)


 6.9 x10^3uL


(1.8-7.7) 


 


 





 


Lymphocytes # (Auto)


 1.1 x10^3/uL


(1.0-4.8) 


 


 





 


Monocytes # (Auto)


 1.1 x10^3/uL


(0.0-1.1) 


 


 





 


Eosinophils # (Auto)


 0.4 x10^3/uL


(0.0-0.7) 


 


 





 


Basophils # (Auto)


 0.1 x10^3/uL


(0.0-0.2) 


 


 





 


Prothrombin Time


 15.3 SEC


(11.7-14.0) 


 


 





 


Prothromb Time International


Ratio 1.2 (0.8-1.1) 


 


 


 





 


Activated Partial


Thromboplast Time 31 SEC (24-38) 


 


 


 





 


Troponin I Quantitative


 0.214 ng/mL


(0.000-0.055) 


 


 





 


NT-Pro-B-Type Natriuretic


Peptide > 67976 pg/mL


(0-124) 


 


 





 


Group A Streptococcus Rapid


 


 Positive


(NEGATIVE) 


 





 


Sodium Level


 


 


 141 mmol/L


(136-145) 





 


Potassium Level


 


 


 3.9 mmol/L


(3.5-5.1) 





 


Chloride Level


 


 


 96 mmol/L


() 





 


Carbon Dioxide Level


 


 


 33 mmol/L


(21-32) 





 


Anion Gap   12 (6-14)  


 


Blood Urea Nitrogen


 


 


 47 mg/dL


(8-26) 





 


Creatinine


 


 


 12.7 mg/dL


(0.7-1.3) 





 


Estimated GFR


(Cockcroft-Gault) 


 


 4.8 


 





 


BUN/Creatinine Ratio   4 (6-20)  


 


Glucose Level


 


 


 99 mg/dL


(70-99) 





 


Calcium Level


 


 


 10.3 mg/dL


(8.5-10.1) 





 


Total Bilirubin


 


 


 0.7 mg/dL


(0.2-1.0) 





 


Aspartate Amino Transf


(AST/SGOT) 


 


 17 U/L (15-37) 


 





 


Alanine Aminotransferase


(ALT/SGPT) 


 


 10 U/L (16-63) 


 





 


Alkaline Phosphatase


 


 


 129 U/L


() 





 


Total Protein


 


 


 8.5 g/dL


(6.4-8.2) 





 


Albumin


 


 


 3.3 g/dL


(3.4-5.0) 





 


Albumin/Globulin Ratio   0.6 (1.0-1.7)  


 


Lactic Acid Level


 


 


 


 1.3 mmol/L


(0.4-2.0)


 


Test


 4/28/19


19:40 4/29/19


05:15 


 





 


Lactic Acid Level


 1.4 mmol/L


(0.4-2.0) 


 


 





 


Sodium Level


 


 142 mmol/L


(136-145) 


 





 


Potassium Level


 


 4.1 mmol/L


(3.5-5.1) 


 





 


Chloride Level


 


 99 mmol/L


() 


 





 


Carbon Dioxide Level


 


 32 mmol/L


(21-32) 


 





 


Anion Gap  11 (6-14)   


 


Blood Urea Nitrogen


 


 53 mg/dL


(8-26) 


 





 


Creatinine


 


 13.9 mg/dL


(0.7-1.3) 


 





 


Estimated GFR


(Cockcroft-Gault) 


 4.3 


 


 





 


Glucose Level


 


 106 mg/dL


(70-99) 


 





 


Calcium Level


 


 9.7 mg/dL


(8.5-10.1) 


 





 


Random Vancomycin Level  21.8 mcg/mL   


 


Hepatitis B Surface Antigen


 


 Nonreactive


(Nonreactive) 


 











Microbiology


4/28/19 Blood Culture - Preliminary, Resulted


          NO GROWTH AFTER 1 DAY


Medications





Current Medications


Clonidine HCl (Catapres) 0.2 mg 1X  ONCE PO  Last administered on 4/28/19at 

12:41;  Start 4/28/19 at 12:45;  Stop 4/28/19 at 12:46;  Status DC


Piperacillin Sod/ Tazobactam Sod 3.375 gm/Sodium Chloride 50 ml @  100 mls/hr 1X

 ONCE IV ;  Start 4/28/19 at 14:45;  Stop 4/28/19 at 15:14;  Status UNV


Vancomycin HCl (Vanco Per Pharmacy) 1 each PRN DAILY  PRN MC SEE COMMENTS Last 

administered on 4/29/19at 08:17;  Start 4/28/19 at 14:45;  Stop 4/29/19 at 10:2

7;  Status DC


Morphine Sulfate (Morphine Sulfate) 4 mg 1X  ONCE IV ;  Start 4/28/19 at 14:45; 

Stop 4/28/19 at 14:46;  Status DC


Piperacillin Sod/ Tazobactam Sod 2.25 gm/Sodium Chloride 50 ml @  100 mls/hr 1X 

ONCE IV  Last administered on 4/28/19at 15:10;  Start 4/28/19 at 14:45;  Stop 

4/28/19 at 15:14;  Status DC


Vancomycin HCl 1.75 gm/Sodium Chloride 500 ml @  250 mls/hr 1X  ONCE IV  Last 

administered on 4/28/19at 15:51;  Start 4/28/19 at 14:45;  Stop 4/28/19 at 

16:44;  Status DC


Ceftriaxone Sodium (Rocephin) 1 gm Q24H IVP  Last administered on 4/29/19at 

17:07;  Start 4/28/19 at 16:00


Benzonatate (Tessalon Perle) 100 mg SIV336 PO  Last administered on 4/29/19at 

21:20;  Start 4/28/19 at 21:00


Albuterol/ Ipratropium (Duoneb) 3 ml RTQID NEB  Last administered on 4/30/19at 

08:02;  Start 4/28/19 at 16:00


Guaifenesin (Robitussin Dm) 10 ml PRN Q6HRS  PRN PO COUGH Last administered on 

4/29/19at 21:20;  Start 4/28/19 at 14:45


Clonidine HCl (Catapres) 0.1 mg PRN Q1HR  PRN PO HYPERTENSION, SEE COMMENTS;  

Start 4/28/19 at 14:45


Temazepam (Restoril) 7.5 mg PRN QHS  PRN PO INSOMNIA;  Start 4/28/19 at 14:45


Ondansetron HCl (Zofran) 4 mg PRN Q6HRS  PRN IV NAUSEA/VOMITING Last 

administered on 4/30/19at 08:26;  Start 4/28/19 at 14:45


Acetaminophen (Tylenol) 500 mg PRN Q6HRS  PRN PO MILD PAIN / TEMP;  Start 

4/28/19 at 14:45


Acetaminophen/ Codeine Phosphate (Tylenol #3) 1 tab PRN Q6HRS  PRN PO MODERATE 

PAIN Last administered on 4/30/19at 04:55;  Start 4/28/19 at 14:45


Morphine Sulfate (Morphine Sulfate) 2 mg PRN Q2HR  PRN IV PAIN Last administered

on 4/29/19at 17:08;  Start 4/28/19 at 14:45


Labetalol HCl (Normodyne Iv Push) 20 mg PRN Q2HR  PRN IVP HYPERTENSION, SEE 

COMMENTS Last administered on 4/30/19at 04:54;  Start 4/28/19 at 14:45


Lidocaine (Lidoderm) 1 patch DAILY TD  Last administered on 4/28/19at 15:20;  

Start 4/28/19 at 15:00


Fentanyl Citrate (Fentanyl 2ml Vial) 50 mcg 1X  ONCE IV  Last administered on 

4/28/19at 15:06;  Start 4/28/19 at 15:00;  Stop 4/28/19 at 15:01;  Status DC


Ondansetron HCl (Zofran) 4 mg PRN Q8HRS  PRN IV NAUSEA/VOMITING;  Start 4/28/19 

at 15:00;  Stop 4/29/19 at 14:59;  Status UNV


Cetirizine HCl (ZyrTEC) 10 mg DAILY PO  Last administered on 4/29/19at 09:58;  

Start 4/28/19 at 15:00


Labetalol HCl (Normodyne Iv Push) 20 mg 1X  ONCE IVP ;  Start 4/28/19 at 15:00; 

Stop 4/28/19 at 15:08;  Status DC


Tramadol HCl (Ultram) 50 mg PRN Q6HRS  PRN PO SEVERE PAIN Last administered on 

4/30/19at 02:53;  Start 4/28/19 at 15:15


Sodium Chloride 500 ml @  500 mls/hr 1X  ONCE IV  Last administered on 4/28/19at

15:16;  Start 4/28/19 at 15:15;  Stop 4/28/19 at 16:14;  Status DC


Vancomycin HCl 500 mg/Sodium Chloride 100 ml @  100 mls/hr MoWeFr@1600 IV ;  

Start 4/29/19 at 16:00;  Stop 4/29/19 at 16:00;  Status DC


Vancomycin HCl (Vancomycin Random Level) 1 each 1X  ONCE MC  Last administered 

on 4/29/19at 05:17;  Start 4/29/19 at 05:00;  Stop 4/29/19 at 05:01;  Status DC


Amlodipine Besylate (Norvasc) 5 mg DAILY PO  Last administered on 4/29/19at 

09:58;  Start 4/29/19 at 09:00;  Stop 4/29/19 at 14:01;  Status DC


Metoprolol Succinate (Toprol Xl) 25 mg DAILY PO  Last administered on 4/29/19at 

09:57;  Start 4/29/19 at 09:00


Sevelamer Carbonate (Renvela) 2,400 mg TIDWMEALS PO  Last administered on 

4/30/19at 08:19;  Start 4/28/19 at 17:00


Atorvastatin Calcium (Lipitor) 80 mg QHS PO  Last administered on 4/29/19at 

21:20;  Start 4/28/19 at 21:00


Budesonide (Pulmicort) 0.5 mg RTBID NEB  Last administered on 4/30/19at 08:02;  

Start 4/28/19 at 20:00


Calcium Acetate (Phoslo) 2,001 mg TIDWMEALS PO  Last administered on 4/30/19at 

08:18;  Start 4/28/19 at 17:00


Cinacalcet (Sensipar) 90 mg QMWF@0900 PO  Last administered on 4/29/19at 09:57; 

Start 4/29/19 at 09:00


Cinacalcet (Sensipar) 60 mg QTUTHSASU@0900 PO ;  Start 4/30/19 at 09:00


Non-Formulary Medication (Loratadine ) 10 mg PRN DAILY  PRN PO allergies;  Start

4/28/19 at 17:00;  Status UNV


Polyethylene Glycol (miraLAX PACKET) 17 gm PRN DAILY  PRN PO CONSTIPATION;  

Start 4/29/19 at 09:00


Tramadol HCl (Ultram) 100 mg PRN Q6HRS  PRN PO PAIN Last administered on 

4/30/19at 07:47;  Start 4/29/19 at 08:45


Sodium Chloride 1,000 ml @  1,000 mls/hr Q1H PRN IV hypotension;  Start 4/29/19 

at 12:52;  Stop 4/29/19 at 18:53;  Status DC


Diphenhydramine HCl (Benadryl) 25 mg 1X PRN  PRN IV ITCHING;  Start 4/29/19 at 

13:00;  Stop 4/30/19 at 12:59


Diphenhydramine HCl (Benadryl) 25 mg 1X PRN  PRN IV ITCHING;  Start 4/29/19 at 

13:00;  Stop 4/30/19 at 12:59


Sodium Chloride 1,000 ml @  400 mls/hr Q2H30M PRN IV PATENCY;  Start 4/29/19 at 

12:52;  Stop 4/30/19 at 00:51;  Status DC


Info (PHARMACY MONITORING -- do not chart) 1 each PRN DAILY  PRN MC SEE 

COMMENTS;  Start 4/29/19 at 13:00


Amlodipine Besylate (Norvasc) 10 mg DAILY PO ;  Start 4/30/19 at 09:00


Amlodipine Besylate (Norvasc) 5 mg 1X  ONCE PO  Last administered on 4/29/19at 

16:09;  Start 4/29/19 at 14:00;  Stop 4/29/19 at 14:03;  Status DC


Lactobacillus Rhamnosus (Culturelle) 1 cap BID PO  Last administered on 

4/29/19at 21:20;  Start 4/29/19 at 21:00


Hydralazine HCl (Apresoline Inj) 10 mg PRN Q4HRS  PRN IVP ELEVATED BP, SEE 

COMMENTS Last administered on 4/30/19at 00:05;  Start 4/29/19 at 17:45


Albuterol Sulfate (Ventolin Neb Soln) 2.5 mg PRN Q4HRS  PRN NEB SHORTNESS OF 

BREATH Last administered on 4/30/19at 02:42;  Start 4/30/19 at 02:45





Active Scripts


Active


Reported


Atorvastatin Calcium 80 Mg Tablet 1 Tab PO DAILY


Renvela (Sevelamer Carbonate) 800 Mg Tablet 3 Tab PO TIDWMEALS


Polyethylene Glycol 3350 17 Gm Powd.pack 1 Pkt PO PRN DAILY PRN


Cinacalcet HCl 60 Mg Tablet 1 Tab PO PRN ALT PRN


Loratadine 10 Mg Tablet 10 Mg PO PRN DAILY PRN


Calcium Acetate 667 Mg Tablet 3 Tab PO TIDBFRMEAL


Symbicort 160-4.5 Mcg Inhaler (Budesonide/Formoterol Fumarate) 10.2 Gm 

Hfa.aer.ad 2 Gm IH DAILY


Metoprolol Succinate ( Xl ) (Metoprolol Succinate) 25 Mg Tab.er.24h 25 Tab PO 

DAILY


Amlodipine Besylate 5 Mg Tablet 5 Tab PO DAILY


Sensipar (Cinacalcet Hcl) 90 Mg Tablet 90 Tab PO MWF


Vitals/I & O





Vital Sign - Last 24 Hours








 4/29/19 4/29/19 4/29/19 4/29/19





 09:57 09:58 11:00 16:09


 


Temp   97.9 





   97.9 


 


Pulse 80 80 67 77


 


Resp   16 


 


B/P (MAP) 173/104 173/104 172/97 (122) 171/104


 


Pulse Ox   92 


 


O2 Delivery   Room Air 


 


    





    





 4/29/19 4/29/19 4/29/19 4/29/19





 16:09 16:10 17:06 19:10


 


Temp    98.2





    98.2


 


Pulse   82 77


 


Resp    18


 


B/P (MAP)   186/105 143/104 (117)


 


Pulse Ox    96


 


O2 Delivery Room Air Room Air  Room Air


 


    





    





 4/29/19 4/29/19 4/29/19 4/30/19





 20:09 20:17 23:10 00:05


 


Temp   98.1 





   98.1 


 


Pulse   78 


 


Resp   18 


 


B/P (MAP)   179/106 (130) 


 


Pulse Ox 97  95 


 


O2 Delivery Room Air Room Air Room Air Room Air


 


    





    





 4/30/19 4/30/19 4/30/19 4/30/19





 00:05 01:06 02:42 02:53


 


Pulse 78   


 


B/P (MAP) 179/106   


 


O2 Delivery  Room Air Nasal Cannula Nasal Cannula


 


O2 Flow Rate   2.0 2.0





 4/30/19 4/30/19 4/30/19 4/30/19





 03:15 04:03 04:54 04:55


 


Temp 97.8   





 97.8   


 


Pulse 74  74 


 


Resp 18   


 


B/P (MAP) 177/102 (127)  177/102 


 


Pulse Ox 99   


 


O2 Delivery Nasal Cannula Nasal Cannula  Nasal Cannula


 


O2 Flow Rate 2.0 3.0  2.0


 


    





    





 4/30/19 4/30/19 4/30/19 





 05:55 07:00 08:04 


 


Temp  97.8  





  97.8  


 


Pulse  73  


 


Resp  18  


 


B/P (MAP)  167/99 (121)  


 


Pulse Ox  96 98 


 


O2 Delivery Room Air Room Air Room Air 














Intake and Output   


 


 4/29/19 4/29/19 4/30/19





 15:00 23:00 07:00


 


Intake Total 360 ml  300 ml


 


Balance 360 ml  300 ml

















CAROL ALFRED MD            Apr 30, 2019 09:41

## 2019-06-03 ENCOUNTER — HOSPITAL ENCOUNTER (EMERGENCY)
Dept: HOSPITAL 61 - ER | Age: 64
Discharge: HOME | End: 2019-06-03
Payer: COMMERCIAL

## 2019-06-03 VITALS — BODY MASS INDEX: 21.92 KG/M2 | WEIGHT: 180 LBS | HEIGHT: 76 IN

## 2019-06-03 VITALS — SYSTOLIC BLOOD PRESSURE: 196 MMHG | DIASTOLIC BLOOD PRESSURE: 110 MMHG

## 2019-06-03 DIAGNOSIS — I12.9: ICD-10-CM

## 2019-06-03 DIAGNOSIS — Z86.73: ICD-10-CM

## 2019-06-03 DIAGNOSIS — Z98.890: ICD-10-CM

## 2019-06-03 DIAGNOSIS — I71.4: Primary | ICD-10-CM

## 2019-06-03 DIAGNOSIS — I51.7: ICD-10-CM

## 2019-06-03 DIAGNOSIS — N18.9: ICD-10-CM

## 2019-06-03 DIAGNOSIS — K80.20: ICD-10-CM

## 2019-06-03 DIAGNOSIS — R06.02: ICD-10-CM

## 2019-06-03 DIAGNOSIS — Z91.19: ICD-10-CM

## 2019-06-03 LAB
ALBUMIN SERPL-MCNC: 3.1 G/DL (ref 3.4–5)
ALBUMIN/GLOB SERPL: 0.7 {RATIO} (ref 1–1.7)
ALP SERPL-CCNC: 120 U/L (ref 46–116)
ALT SERPL-CCNC: 16 U/L (ref 16–63)
ANION GAP SERPL CALC-SCNC: 9 MMOL/L (ref 6–14)
ANISOCYTOSIS BLD QL SMEAR: SLIGHT
AST SERPL-CCNC: 24 U/L (ref 15–37)
BASOPHILS # BLD AUTO: 0.1 X10^3/UL (ref 0–0.2)
BASOPHILS NFR BLD: 2 % (ref 0–3)
BILIRUB SERPL-MCNC: 0.4 MG/DL (ref 0.2–1)
BUN SERPL-MCNC: 36 MG/DL (ref 8–26)
BUN/CREAT SERPL: 4 (ref 6–20)
CALCIUM SERPL-MCNC: 8.9 MG/DL (ref 8.5–10.1)
CHLORIDE SERPL-SCNC: 98 MMOL/L (ref 98–107)
CK SERPL-CCNC: 55 U/L (ref 39–308)
CO2 SERPL-SCNC: 34 MMOL/L (ref 21–32)
CREAT SERPL-MCNC: 8.8 MG/DL (ref 0.7–1.3)
EOSINOPHIL NFR BLD: 0.4 X10^3/UL (ref 0–0.7)
EOSINOPHIL NFR BLD: 8 % (ref 0–3)
ERYTHROCYTE [DISTWIDTH] IN BLOOD BY AUTOMATED COUNT: 19.5 % (ref 11.5–14.5)
GFR SERPLBLD BASED ON 1.73 SQ M-ARVRAT: 7.4 ML/MIN
GLOBULIN SER-MCNC: 4.7 G/DL (ref 2.2–3.8)
GLUCOSE SERPL-MCNC: 90 MG/DL (ref 70–99)
HCT VFR BLD CALC: 29 % (ref 39–53)
HGB BLD-MCNC: 9.6 G/DL (ref 13–17.5)
LIPASE: 79 U/L (ref 73–393)
LYMPHOCYTES # BLD: 1.1 X10^3/UL (ref 1–4.8)
LYMPHOCYTES NFR BLD AUTO: 22 % (ref 24–48)
MCH RBC QN AUTO: 29 PG (ref 25–35)
MCHC RBC AUTO-ENTMCNC: 33 G/DL (ref 31–37)
MCV RBC AUTO: 88 FL (ref 79–100)
MONO #: 0.5 X10^3/UL (ref 0–1.1)
MONOCYTES NFR BLD: 11 % (ref 0–9)
NEUT #: 2.8 X10^3UL (ref 1.8–7.7)
NEUTROPHILS NFR BLD AUTO: 57 % (ref 31–73)
PLATELET # BLD AUTO: 126 X10^3/UL (ref 140–400)
PLATELET # BLD EST: (no result) 10*3/UL
POIKILOCYTOSIS BLD QL SMEAR: PRESENT
POTASSIUM SERPL-SCNC: 4.5 MMOL/L (ref 3.5–5.1)
PROT SERPL-MCNC: 7.8 G/DL (ref 6.4–8.2)
PROTHROMBIN TIME: 14.3 SEC (ref 11.7–14)
RBC # BLD AUTO: 3.3 X10^6/UL (ref 4.3–5.7)
SCHISTOCYTES BLD QL SMEAR: (no result)
SODIUM SERPL-SCNC: 141 MMOL/L (ref 136–145)
WBC # BLD AUTO: 4.9 X10^3/UL (ref 4–11)

## 2019-06-03 PROCEDURE — 74176 CT ABD & PELVIS W/O CONTRAST: CPT

## 2019-06-03 PROCEDURE — 85610 PROTHROMBIN TIME: CPT

## 2019-06-03 PROCEDURE — 71045 X-RAY EXAM CHEST 1 VIEW: CPT

## 2019-06-03 PROCEDURE — 85025 COMPLETE CBC W/AUTO DIFF WBC: CPT

## 2019-06-03 PROCEDURE — 36415 COLL VENOUS BLD VENIPUNCTURE: CPT

## 2019-06-03 PROCEDURE — 82550 ASSAY OF CK (CPK): CPT

## 2019-06-03 PROCEDURE — 93005 ELECTROCARDIOGRAM TRACING: CPT

## 2019-06-03 PROCEDURE — 80053 COMPREHEN METABOLIC PANEL: CPT

## 2019-06-03 PROCEDURE — 83690 ASSAY OF LIPASE: CPT

## 2019-06-03 NOTE — EKG
[facility]

              [address] [place] [address]

Test Date:    2019               Test Time:    09:06:43

Pat Name:     KOKI TILLMAN               Department:   

Patient ID:   [id number]           Room:          

Gender:       M                        Technician:   

:          [id number]               Requested By: VIRGINIA BILLS

Order Number: [id number]           Reading MD:     

                                 Measurements

Intervals                              Axis          

Rate:         83                       P:            -20

KY:           202                      QRS:          -23

QRSD:         104                      T:            62

QT:           368                                    

QTc:          438                                    

                           Interpretive Statements

SINUS RHYTHM

LEFT ATRIAL ABNORMALITY

LEFTWARD AXIS

NON SPECIFIC T ABNORMALITY

ABNORMAL ECG

No previous ECG available for comparison

## 2019-06-03 NOTE — RAD
EXAM: CT ABDOMEN/PELVIS WITHOUT CONTRAST.

 

HISTORY: Abdominal pain.

 

TECHNIQUE: Computed tomography of the abdomen and pelvis was performed 

without intravenous contrast.

 

COMPARISON: None.

 

FINDINGS: Lung windows through the visualized portions of the bases reveal

a small right pleural effusion. There is atelectasis or scarring in both 

lung bases. The heart is at least mildly enlarged. Coronary 

atherosclerotic calcifications are noted. Bone windows reveal no 

suspicious lesions. Multiple Schmorl's nodes are seen throughout the lower

thoracic and lumbar spine.

 

Both kidneys are moderately atrophic with multiple cysts bilaterally 

suggesting acquired polycystic disease. No solid lesion is appreciated 

without contrast. Small calculi on the right measure up to 3 mm. There is 

no hydronephrosis.

 

Thin calcifications along the hepatic surface. No additional peritoneal 

calcifications or masses are identified. There is no ascites.

 

A gallstone is noted. There is no clear pericholecystic inflammation. The 

pancreas, adrenal glands and spleen are unremarkable. There are no 

pathologically enlarged lymph nodes. The appendix is not inflamed. There 

is no small bowel obstruction.

 

There are diffuse atherosclerotic calcifications. The superior mesenteric 

artery is ectatic at 1.3 cm and diffusely calcified. Patency cannot be 

assessed without contrast. The origins of both renal arteries are 

diffusely calcified.

 

An infrarenal abdominal aortic aneurysm measures up to 4.0 x 3.6 cm in its

fusiform portion. It contains a chronic calcified dissection flap. Along 

its left lateral aspect, there is a superimposed saccular aneurysm 

measuring 3.7 x 2.9 x 2.7 cm. Its neck measures 1.3 cm. There is no 

retroperitoneal hemorrhage. The right common iliac artery measures 2.1 cm.

The left measures 1.7 cm.

 

IMPRESSION: 

1. 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 

a 4.0 cm fusiform infrarenal abdominal aortic aneurysm. This may represent

the sequela of a chronic, large penetrating atherosclerotic ulcer, or 

possibly an old mycotic aneurysm. No evidence of active hemorrhage. 

Ongoing management is recommended.

2. Both common iliac arteries are also ectatic measuring up to 2.1 cm on 

the right. The superior mesenteric artery is ectatic at 1.3 cm. Diffuse 

severe atherosclerotic calcifications.

3. Bilateral moderate to severe renal atrophy with acquired cystic 

disease.

4. Cholelithiasis without clear acute cholecystitis by CT.

5. Cardiomegaly.

 

These findings were called to Dr. Madrid by Julio César Serna on 6/3/2019 at 

12:00 PM.

 

 

*One or more of the following individualized dose reduction techniques 

were utilized for this examination:  

1. Automated exposure control.  

2. Adjustment of the mA and/or kV according to patient size.  

3. Use of iterative reconstruction technique.

 

Electronically signed by: THERESA Serna MD (6/3/2019 12:08 PM) [facility]

## 2019-06-03 NOTE — PHYS DOC
Past Medical History


Past Medical History:  CVA, Hypertension, Hepatitis, Liver Disease, Renal 

Failure, Stroke


Additional Past Medical Histor:  HEP C,


Past Surgical History:  Other


Additional Past Surgical Histo:  LEFT FA SHUNT,HERNIA


Alcohol Use:  Occasionally


Drug Use:  Marijuana





Adult General


Chief Complaint


Chief Complaint:  COUGH





HPI


HPI





Patient is a 64  year old male with history of chronic renal failure on dialysis

brought in by EMS because of shortness of breath. Patient gives different 

history to EMS, triage nurse and me. Patient complaining of shortness of breath 

is a chronic problem and states she woke up middle of night because of shortness

of breath. Patient was at dialysis center and had cough with complaining of 

chronic abdominal pain and 45 minutes after starting dialysis was sent to ER for

evaluation. Patient told me that he had abdominal pain for 2 hours as a constant

pain without radiation, nausea and vomiting, diarrhea and constipation fever and

chills, urinary symptom. Patient told triage nurse that he has had chronic 

abdominal pain without new changes.





Review of Systems


Review of Systems





Constitutional: Denies fever or chills []


Eyes: Denies change in visual acuity, redness, or eye pain []


HENT: Denies nasal congestion or sore throat []


Respiratory: Reports cough and shortness of breath


Cardiovascular: No additional information not addressed in HPI []


GI: Reports abdominal pain, denies nausea, vomiting, bloody stools or diarrhea 

[]


: Denies dysuria or hematuria []


Musculoskeletal: Denies back pain or joint pain []


Integument: Denies rash or skin lesions []


Neurologic: Denies headache, focal weakness or sensory changes []


Endocrine: Denies polyuria or polydipsia []





All other systems were reviewed and found to be within normal limits, except as 

documented in this note.





Current Medications


Current Medications





Current Medications








 Medications


  (Trade)  Dose


 Ordered  Sig/Juan  Start Time


 Stop Time Status Last Admin


Dose Admin


 


 Clonidine HCl


  (Catapres)  0.1 mg  1X  ONCE  6/3/19 11:45


 6/3/19 11:46 DC 6/3/19 11:45


0.1 MG











Allergies


Allergies





Allergies








Coded Allergies Type Severity Reaction Last Updated Verified


 


  No Known Drug Allergies    16 No











Physical Exam


Physical Exam





Constitutional: Well  nourished, no acute distress, non-toxic appearance. []


HENT: Normocephalic, atraumatic, oropharynx moist.


Eyes: PERRLA, EOMI, conjunctiva normal, no discharge. [] 


Neck: Normal range of motion, no tenderness, supple, no stridor. [] 


Cardiovascular:Heart rate regular rhythm, no murmur []


Lungs & Thorax:  Bilateral breath sounds clear to auscultation []


Abdomen: Bowel sounds normal, soft, no tenderness, no masses, no pulsatile 

masses. [] 


Skin: Warm, dry, no erythema, no rash. [] 


Back: No tenderness, no CVA tenderness. [] 


Extremities: No tenderness, no cyanosis, no clubbing, ROM intact, no edema. [] 


Neurologic: Alert and oriented X 3, normal motor function, normal sensory 

function, no focal deficits noted. []


Psychologic: Affect normal,  mood normal. []





Current Patient Data


Vital Signs





                                   Vital Signs








  Date Time  Temp Pulse Resp B/P (MAP) Pulse Ox O2 Delivery O2 Flow Rate FiO2


 


6/3/19 12:20  85 18 196/110 (138) 97 Room Air  


 


6/3/19 08:22 98.3       





 98.3       








Lab Values





                                Laboratory Tests








Test


 6/3/19


09:09


 


White Blood Count


 4.9 x10^3/uL


(4.0-11.0)


 


Red Blood Count


 3.30 x10^6/uL


(4.30-5.70)  L


 


Hemoglobin


 9.6 g/dL


(13.0-17.5)  L


 


Hematocrit


 29.0 %


(39.0-53.0)  L


 


Mean Corpuscular Volume


 88 fL ()





 


Mean Corpuscular Hemoglobin 29 pg (25-35)  


 


Mean Corpuscular Hemoglobin


Concent 33 g/dL


(31-37)


 


Red Cell Distribution Width


 19.5 %


(11.5-14.5)  H


 


Platelet Count


 126 x10^3/uL


(140-400)  L


 


Neutrophils (%) (Auto) 57 % (31-73)  


 


Lymphocytes (%) (Auto) 22 % (24-48)  L


 


Monocytes (%) (Auto) 11 % (0-9)  H


 


Eosinophils (%) (Auto) 8 % (0-3)  H


 


Basophils (%) (Auto) 2 % (0-3)  


 


Neutrophils # (Auto)


 2.8 x10^3uL


(1.8-7.7)


 


Lymphocytes # (Auto)


 1.1 x10^3/uL


(1.0-4.8)


 


Monocytes # (Auto)


 0.5 x10^3/uL


(0.0-1.1)


 


Eosinophils # (Auto)


 0.4 x10^3/uL


(0.0-0.7)


 


Basophils # (Auto)


 0.1 x10^3/uL


(0.0-0.2)


 


Platelet Estimate


 Decreased


(ADEQUATE)


 


Large Platelets Few  


 


Giant Platelets Present  


 


Poikilocytosis Present  


 


Anisocytosis Slight  


 


Schistocytes Occ  


 


Prothrombin Time


 14.3 SEC


(11.7-14.0)  H


 


Prothrombin Time INR 1.1 (0.8-1.1)  


 


Sodium Level


 141 mmol/L


(136-145)


 


Potassium Level


 4.5 mmol/L


(3.5-5.1)


 


Chloride Level


 98 mmol/L


()


 


Carbon Dioxide Level


 34 mmol/L


(21-32)  H


 


Anion Gap 9 (6-14)  


 


Blood Urea Nitrogen


 36 mg/dL


(8-26)  H


 


Creatinine


 8.8 mg/dL


(0.7-1.3)  H


 


Estimated GFR


(Cockcroft-Gault) 7.4  





 


BUN/Creatinine Ratio 4 (6-20)  L


 


Glucose Level


 90 mg/dL


(70-99)


 


Calcium Level


 8.9 mg/dL


(8.5-10.1)


 


Total Bilirubin


 0.4 mg/dL


(0.2-1.0)


 


Aspartate Amino Transferase


(AST) 24 U/L (15-37)





 


Alanine Aminotransferase (ALT)


 16 U/L (16-63)





 


Alkaline Phosphatase


 120 U/L


()  H


 


Creatine Kinase


 55 U/L


()


 


Total Protein


 7.8 g/dL


(6.4-8.2)


 


Albumin


 3.1 g/dL


(3.4-5.0)  L


 


Albumin/Globulin Ratio


 0.7 (1.0-1.7)


L


 


Lipase


 79 U/L


()





                                Laboratory Tests


6/3/19 09:09








                                Laboratory Tests


6/3/19 09:09














EKG


EKG


EKG interpreted by me. EKG at 0906 showed normal sinus rhythm at rate of 84, 

left atrial abnormalities, left calderon axis, no acute ST and T-wave abnormalities.





Radiology/Procedures


Radiology/Procedures


[facility]


                    [address] [place] [address]


                                 [phone]


                                        


                                 IMAGING REPORT





                                     Signed





PATIENT: YOUNG,KOKI       ACCOUNT: [id number]     MRN#: [id number]


: 1955           LOCATION: ER              AGE: 64


SEX: M                    EXAM DT: 19         ACCESSION#: [id number]


STATUS: PRE ER            ORD. PHYSICIAN: VIRGINIA BILLS MD   


REASON: abdominal pain and shortness of breath


PROCEDURE: PORTABLE CHEST 1V





EXAM: Chest one view.





HISTORY: Shortness of breath.





COMPARISON: 2019.





FINDINGS: A frontal view of the chest is obtained.    





Opacities and mild volume loss in the right base may represent scarring but


are indeterminate. The there is some correlate in the right infrahilar region


on the prior study. Linear opacities in the left base indicates scarring or


interstitial lung disease. Multiple blebs are noted in the right apex.


Hyperinflation is consistent with chronic obstructive pulmonary disease. There


are atherosclerotic calcifications of the aorta.  Blunting of the right


costophrenic angle indicate scarring or small pleural effusion. There is no


pneumothorax. The heart is moderately enlarged.    





IMPRESSION: 


1. Right infrahilar opacity and right basilar volume loss may represent


scarring but are indeterminate. CT of the chest could exclude a parenchymal


mass if long-term stability is not already known.


2. Chronic obstructive pulmonary disease with bibasilar scarring or


interstitial lung disease.


3. Moderate cardiomegaly.

















DICTATED and SIGNED BY:     ROSCOE SERNA MD


DATE:     19 [address] [place] [address]


                    [address]  [place], [place] [id number] [phone]


                                        


                                 IMAGING REPORT





                                     Signed





PATIENT: KOKI TILLMAN       ACCOUNT: [id number]     MRN#: [id number]


: 1955           LOCATION: ER              AGE: 64


SEX: M                    EXAM DT: 19         ACCESSION#: [id number]


STATUS: REG ER            ORD. PHYSICIAN: VIRGINIA BILLS MD   


REASON: abdominal pain


PROCEDURE: CT ABDOMEN PELVIS WO CONTRAST





EXAM: CT ABDOMEN/PELVIS WITHOUT CONTRAST.


 


HISTORY: Abdominal pain.


 


TECHNIQUE: Computed tomography of the abdomen and pelvis was performed 


without intravenous contrast.


 


COMPARISON: None.


 


FINDINGS: Lung windows through the visualized portions of the bases reveal


a small right pleural effusion. There is atelectasis or scarring in both 


lung bases. The heart is at least mildly enlarged. Coronary 


atherosclerotic calcifications are noted. Bone windows reveal no 


suspicious lesions. Multiple Schmorl's nodes are seen throughout the lower


thoracic and lumbar spine.


 


Both kidneys are moderately atrophic with multiple cysts bilaterally 


suggesting acquired polycystic disease. No solid lesion is appreciated 


without contrast. Small calculi on the right measure up to 3 mm. There is 


no hydronephrosis.


 


Thin calcifications along the hepatic surface. No additional peritoneal 


calcifications or masses are identified. There is no ascites.


 


A gallstone is noted. There is no clear pericholecystic inflammation. The 


pancreas, adrenal glands and spleen are unremarkable. There are no 


pathologically enlarged lymph nodes. The appendix is not inflamed. There 


is no small bowel obstruction.


 


There are diffuse atherosclerotic calcifications. The superior mesenteric 


artery is ectatic at 1.3 cm and diffusely calcified. Patency cannot be 


assessed without contrast. The origins of both renal arteries are 


diffusely calcified.


 


An infrarenal abdominal aortic aneurysm measures up to 4.0 x 3.6 cm in its


fusiform portion. It contains a chronic calcified dissection flap. Along 


its left lateral aspect, there is a superimposed saccular aneurysm 


measuring 3.7 x 2.9 x 2.7 cm. Its neck measures 1.3 cm. There is no 


retroperitoneal hemorrhage. The right common iliac artery measures 2.1 cm.


The left measures 1.7 cm.


 


IMPRESSION: 


1. 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 


a 4.0 cm fusiform infrarenal abdominal aortic aneurysm. This may represent


the sequela of a chronic, large penetrating atherosclerotic ulcer, or 


possibly an old mycotic aneurysm. No evidence of active hemorrhage. 


Ongoing management is recommended.


2. Both common iliac arteries are also ectatic measuring up to 2.1 cm on 


the right. The superior mesenteric artery is ectatic at 1.3 cm. Diffuse 


severe atherosclerotic calcifications.


3. Bilateral moderate to severe renal atrophy with acquired cystic 


disease.


4. Cholelithiasis without clear acute cholecystitis by CT.


5. Cardiomegaly.


 


These findings were called to Dr. Bills by Julio César Serna on 6/3/2019 at 


12:00 PM.


 


 


*One or more of the following individualized dose reduction techniques 


were utilized for this examination:  


1. Automated exposure control.  


2. Adjustment of the mA and/or kV according to patient size.  


3. Use of iterative reconstruction technique.


 


Electronically signed by: THERESA Serna MD (6/3/2019 12:08 PM) [facility]














DICTATED and SIGNED BY:     ROSCOE SERNA MD


DATE:     19 9578





Course & Med Decision Making


Course & Med Decision Making


Pertinent Labs and Imaging studies reviewed. (See chart for details)





Evaluation of patient in ER showed 64-year-old male patient presented by EMS 

several chronic complaint. Because of technical problem patient was waiting in 

ER for a while for reports of CT and finally after reports of CT of abdomen and 

pelvis decided to leave and not stay here anymore. CT showed saccular infrarenal

 aortic aneurysm with needs for vascular consult but patient decided to leave. 

Patient was advised to follow-up with on-call interventional vascular r

adiologist in one or 2 days. Patient ambulated and walked from ER without 

problem.








I've spoken with the patient and/or caregivers. I've explained the patient's 

condition, diagnosis and treatment plan based on information available to me at 

this time. I've answered the patient's and/or caregivers questions and addressed

 any concerns. The patient and/or caregivers have a good understanding the 

patient's diagnosis, condition and treatment plan as can be expected at this 

point. Vital signs have been stabilized. The patient's condition is stable for 

discharge from the emergency department.





The patient will pursue further outpatient evaluation with her primary care 

provider or other designated consulting physician as outlined in the discharge 

instructions. Patient and/or caregivers are agreeable to this plan of care and 

follow-up instructions have been explained in detail. The patient and/or 

caregivers have received these instructions in written format and expressed 

understanding of these discharge instructions. The patient and her caregivers 

are aware that if any significant change in condition or worsening of symptoms 

should prompt him to immediately return to this of the closest emergency 

department.  If an emergent department is not readily available I would 

encourage him to call [phone].





Luecro Disclaimer


Dragon Disclaimer


This electronic medical record was generated, in whole or in part, using a voice

 recognition dictation system.





Departure


Departure


Impression:  


   Primary Impression:  


   Aortic aneurysm


   Additional Impressions:  


   Abdominal pain


   Chronic renal failure


   Noncompliance


   Saccular aneurysm


Disposition:   HOME, SELF-CARE (at 1221)


Condition:  STABLE


Referrals:  


UNKNOWN PCP NAME (PCP)








CANDIDO THOMAS Jr, MD


Patient Instructions:  Abdominal Aortic Aneurysm, Abdominal Pain





Additional Instructions:  


Follow-up with on-call vascular surgeon Dr. Thomas in one or 2 days regarding 

aortic aneurysm


Follow-up with your scheduled dialysis


Return to ER if not getting better





Problem Qualifiers








   Primary Impression:  


   Aortic aneurysm


   Aortic location:  abdominal aorta  Presence of rupture:  without rupture  

   Qualified Codes:  I71.4 - Abdominal aortic aneurysm, without rupture


   Additional Impressions:  


   Abdominal pain


   Abdominal location:  unspecified location  Qualified Codes:  R10.9 - 

   Unspecified abdominal pain


   Chronic renal failure


   Chronic kidney disease stage:  unspecified stage  Qualified Codes:  N18.9 - 

   Chronic kidney disease, unspecified








VIRGINIA BILLS MD              Golden 3, 2019 10:19

## 2019-06-03 NOTE — RAD
EXAM: Chest one view.



HISTORY: Shortness of breath.



COMPARISON: 4/29/2019.



FINDINGS: A frontal view of the chest is obtained.    



Opacities and mild volume loss in the right base may represent scarring but

are indeterminate. The there is some correlate in the right infrahilar region

on the prior study. Linear opacities in the left base indicates scarring or

interstitial lung disease. Multiple blebs are noted in the right apex.

Hyperinflation is consistent with chronic obstructive pulmonary disease. There

are atherosclerotic calcifications of the aorta.  Blunting of the right

costophrenic angle indicate scarring or small pleural effusion. There is no

pneumothorax. The heart is moderately enlarged.    



IMPRESSION: 

1. Right infrahilar opacity and right basilar volume loss may represent

scarring but are indeterminate. CT of the chest could exclude a parenchymal

mass if long-term stability is not already known.

2. Chronic obstructive pulmonary disease with bibasilar scarring or

interstitial lung disease.

3. Moderate cardiomegaly.

## 2023-02-16 NOTE — CARD
MR#: [id number]

Account#: [id number]

Accession#: [id number]

Date of Study: 04/29/2019

Ordering Physician: AURY BANKS, 

Referring Physician: AURY BANKS 

Tech: Kayla Danielle [facility]





--------------- APPROVED REPORT --------------





EXAM: Two-dimensional and M-mode echocardiogram with Doppler and color Doppler.



Other Information 

Quality : Good



INDICATION

Murmur 



2D DIMENSIONS 

RVDd3.0 (2.9-3.5cm)Left Atrium(2D)4.3 (1.6-4.0cm)

IVSd1.3 (0.7-1.1cm)Aortic Root(2D)3.7 (2.0-3.7cm)

LVDd5.0 (3.9-5.9cm)LVOT Diameter2.3 (1.8-2.4cm)

PWd0.9 (0.7-1.1cm)LVDs3.1 (2.5-4.0cm)

FS (%) 30.0 %SV82.4 ml

LVEF(%)60.0 (>50%)



Aortic Valve

AoV Peak Dexter.108.4cm/sAoV VTI18.5cm

AO Peak GR.4.7mmHgLVOT Peak Dexter.91.7cm/s

LVOT  VTI 17.43cmAO Mean GR.3mmHg

ARNAV (VMAX)3.11gh0KNO   (VTI)3.88cm2



Mitral Valve

MV E Acbdrwdv676.3cm/sMV E Peak Gr.174mmHg

MV DECEL [id number] A Uejfccbu647.2cm/s

MV TDM38xfF/A  Ratio1.1

MVA (PHT)2.27cm2



TDI

E/Lateral E'36.9E/Medial E'24.3



Tricuspid Valve

TR P. Ygxoecsf937lc/sRAP AIENJRJV0mrXr

TR Peak Gr.36vlRfTIEN47nxQf



Pulmonary Vein

S1 Wgtyzead72.1cm/sD2 Iesozvuw90.6cm/s



 LEFT VENTRICLE 

The left ventricle is normal size. There is mild asymmetric septal hypertrophy. The left ventricular 
systolic function is normal. The Ejection Fraction is 55-60%. There is normal LV segmental wall motio
n. Transmitral Doppler flow pattern is Grade II-pseudonormal filling dynamics.



 RIGHT VENTRICLE 

The right ventricle is normal size. The right ventricular systolic function is normal.



 ATRIA 

The left atrium is mildly dilated. The right atrium size is normal. The interatrial septum is intact 
with no evidence for an atrial septal defect or patent foramen ovale as noted on 2-D or Doppler imagi
ng.



 AORTIC VALVE 

The aortic valve is calcified but opens well. Doppler and Color Flow revealed trace aortic regurgitat
ion. There is no significant aortic valvular stenosis.



 MITRAL VALVE 

The mitral valve is mildly thickened. Mitral annular calcification is moderate. There is no evidence 
of mitral valve prolapse. There is no mitral valve stenosis. Doppler and Color-flow revealed moderate
 to severe mitral regurgitation.



 TRICUSPID VALVE 

The tricuspid valve is normal in structure and function. Doppler and Color Flow revealed mild tricusp
id regurgitation. There is moderate pulmonary hypertension. The PA pressure was estimated at 58 mmHg.
 There is no tricuspid valve stenosis.



 PULMONIC VALVE 

The pulmonic valve is not well visualized. Doppler and Color Flow revealed no pulmonic valvular regur
gitation. There is no pulmonic valvular stenosis.



 GREAT VESSELS 

The aortic root is normal in size. The ascending aorta is not well seen. The IVC is normal in size an
d collapses >50% with inspiration.



 PERICARDIAL EFFUSION 

There is no evidence of significant pericardial effusion.



Critical Notification

Critical Value: No



<Conclusion>

The left ventricular systolic function is normal.

The Ejection Fraction is 55-60%.

There is normal LV segmental wall motion.

Moderate to severe mitral regurgitation.

Mild tricuspid regurgitation.

There is moderate pulmonary hypertension.

The PA pressure was estimated at 58 mmHg.

There is no evidence of significant pericardial effusion.



Signed by : Avinash Monge, 

Electronically Approved : 04/29/2019 11:11:12 Previous Labs: No How Did The Hair Loss Occur?: gradual in onset How Severe Is Your Hair Loss?: moderate